# Patient Record
Sex: MALE | Race: WHITE | NOT HISPANIC OR LATINO | Employment: FULL TIME | URBAN - METROPOLITAN AREA
[De-identification: names, ages, dates, MRNs, and addresses within clinical notes are randomized per-mention and may not be internally consistent; named-entity substitution may affect disease eponyms.]

---

## 2018-11-27 ENCOUNTER — HOSPITAL ENCOUNTER (EMERGENCY)
Facility: CLINIC | Age: 43
Discharge: HOME | End: 2018-11-27
Attending: STUDENT IN AN ORGANIZED HEALTH CARE EDUCATION/TRAINING PROGRAM | Admitting: STUDENT IN AN ORGANIZED HEALTH CARE EDUCATION/TRAINING PROGRAM
Payer: COMMERCIAL

## 2018-11-27 VITALS
OXYGEN SATURATION: 100 % | DIASTOLIC BLOOD PRESSURE: 71 MMHG | WEIGHT: 216 LBS | SYSTOLIC BLOOD PRESSURE: 133 MMHG | TEMPERATURE: 98.8 F | HEART RATE: 82 BPM | RESPIRATION RATE: 16 BRPM

## 2018-11-27 DIAGNOSIS — R29.898 BILATERAL LEG WEAKNESS: ICD-10-CM

## 2018-11-27 LAB
ALBUMIN SERPL-MCNC: 3.9 G/DL (ref 3.4–5)
ALBUMIN UR-MCNC: NEGATIVE MG/DL
ALP SERPL-CCNC: 72 U/L (ref 40–150)
ALT SERPL W P-5'-P-CCNC: 60 U/L (ref 0–70)
ANION GAP SERPL CALCULATED.3IONS-SCNC: 7 MMOL/L (ref 3–14)
APPEARANCE UR: CLEAR
AST SERPL W P-5'-P-CCNC: 29 U/L (ref 0–45)
BASOPHILS # BLD AUTO: 0.1 10E9/L (ref 0–0.2)
BASOPHILS NFR BLD AUTO: 0.4 %
BILIRUB SERPL-MCNC: 0.5 MG/DL (ref 0.2–1.3)
BILIRUB UR QL STRIP: NEGATIVE
BUN SERPL-MCNC: 13 MG/DL (ref 7–30)
CALCIUM SERPL-MCNC: 8.4 MG/DL (ref 8.5–10.1)
CHLORIDE SERPL-SCNC: 107 MMOL/L (ref 94–109)
CK SERPL-CCNC: 223 U/L (ref 30–300)
CO2 SERPL-SCNC: 26 MMOL/L (ref 20–32)
COLOR UR AUTO: YELLOW
CREAT SERPL-MCNC: 0.83 MG/DL (ref 0.66–1.25)
DIFFERENTIAL METHOD BLD: ABNORMAL
EOSINOPHIL # BLD AUTO: 0.3 10E9/L (ref 0–0.7)
EOSINOPHIL NFR BLD AUTO: 2 %
ERYTHROCYTE [DISTWIDTH] IN BLOOD BY AUTOMATED COUNT: 13.1 % (ref 10–15)
GFR SERPL CREATININE-BSD FRML MDRD: >90 ML/MIN/1.7M2
GLUCOSE SERPL-MCNC: 109 MG/DL (ref 70–99)
GLUCOSE UR STRIP-MCNC: NEGATIVE MG/DL
HCT VFR BLD AUTO: 46.9 % (ref 40–53)
HGB BLD-MCNC: 15.6 G/DL (ref 13.3–17.7)
HGB UR QL STRIP: NEGATIVE
IMM GRANULOCYTES # BLD: 0.1 10E9/L (ref 0–0.4)
IMM GRANULOCYTES NFR BLD: 0.3 %
KETONES UR STRIP-MCNC: NEGATIVE MG/DL
LEUKOCYTE ESTERASE UR QL STRIP: NEGATIVE
LYMPHOCYTES # BLD AUTO: 2.4 10E9/L (ref 0.8–5.3)
LYMPHOCYTES NFR BLD AUTO: 15.5 %
MCH RBC QN AUTO: 28.8 PG (ref 26.5–33)
MCHC RBC AUTO-ENTMCNC: 33.3 G/DL (ref 31.5–36.5)
MCV RBC AUTO: 87 FL (ref 78–100)
MONOCYTES # BLD AUTO: 1.7 10E9/L (ref 0–1.3)
MONOCYTES NFR BLD AUTO: 11.1 %
MUCOUS THREADS #/AREA URNS LPF: PRESENT /LPF
NEUTROPHILS # BLD AUTO: 10.9 10E9/L (ref 1.6–8.3)
NEUTROPHILS NFR BLD AUTO: 70.7 %
NITRATE UR QL: NEGATIVE
NRBC # BLD AUTO: 0 10*3/UL
NRBC BLD AUTO-RTO: 0 /100
PH UR STRIP: 5 PH (ref 5–7)
PLATELET # BLD AUTO: 253 10E9/L (ref 150–450)
POTASSIUM SERPL-SCNC: 3.2 MMOL/L (ref 3.4–5.3)
PROT SERPL-MCNC: 7.5 G/DL (ref 6.8–8.8)
RBC # BLD AUTO: 5.41 10E12/L (ref 4.4–5.9)
RBC #/AREA URNS AUTO: <1 /HPF (ref 0–2)
SODIUM SERPL-SCNC: 140 MMOL/L (ref 133–144)
SOURCE: ABNORMAL
SP GR UR STRIP: 1.01 (ref 1–1.03)
UROBILINOGEN UR STRIP-MCNC: 0 MG/DL (ref 0–2)
WBC # BLD AUTO: 15.4 10E9/L (ref 4–11)
WBC #/AREA URNS AUTO: <1 /HPF (ref 0–5)

## 2018-11-27 PROCEDURE — 81001 URINALYSIS AUTO W/SCOPE: CPT | Performed by: STUDENT IN AN ORGANIZED HEALTH CARE EDUCATION/TRAINING PROGRAM

## 2018-11-27 PROCEDURE — 25000128 H RX IP 250 OP 636: Performed by: STUDENT IN AN ORGANIZED HEALTH CARE EDUCATION/TRAINING PROGRAM

## 2018-11-27 PROCEDURE — 99284 EMERGENCY DEPT VISIT MOD MDM: CPT | Mod: 25 | Performed by: STUDENT IN AN ORGANIZED HEALTH CARE EDUCATION/TRAINING PROGRAM

## 2018-11-27 PROCEDURE — 85025 COMPLETE CBC W/AUTO DIFF WBC: CPT | Performed by: STUDENT IN AN ORGANIZED HEALTH CARE EDUCATION/TRAINING PROGRAM

## 2018-11-27 PROCEDURE — 96361 HYDRATE IV INFUSION ADD-ON: CPT | Performed by: STUDENT IN AN ORGANIZED HEALTH CARE EDUCATION/TRAINING PROGRAM

## 2018-11-27 PROCEDURE — 82550 ASSAY OF CK (CPK): CPT | Performed by: STUDENT IN AN ORGANIZED HEALTH CARE EDUCATION/TRAINING PROGRAM

## 2018-11-27 PROCEDURE — 80053 COMPREHEN METABOLIC PANEL: CPT | Performed by: STUDENT IN AN ORGANIZED HEALTH CARE EDUCATION/TRAINING PROGRAM

## 2018-11-27 PROCEDURE — 25000132 ZZH RX MED GY IP 250 OP 250 PS 637: Performed by: STUDENT IN AN ORGANIZED HEALTH CARE EDUCATION/TRAINING PROGRAM

## 2018-11-27 PROCEDURE — 96360 HYDRATION IV INFUSION INIT: CPT | Performed by: STUDENT IN AN ORGANIZED HEALTH CARE EDUCATION/TRAINING PROGRAM

## 2018-11-27 PROCEDURE — 99284 EMERGENCY DEPT VISIT MOD MDM: CPT | Mod: Z6 | Performed by: STUDENT IN AN ORGANIZED HEALTH CARE EDUCATION/TRAINING PROGRAM

## 2018-11-27 RX ORDER — POTASSIUM CHLORIDE 1.5 G/1.58G
40 POWDER, FOR SOLUTION ORAL ONCE
Status: COMPLETED | OUTPATIENT
Start: 2018-11-27 | End: 2018-11-27

## 2018-11-27 RX ORDER — CETIRIZINE HYDROCHLORIDE 10 MG/1
10 TABLET ORAL DAILY
COMMUNITY
End: 2023-05-08

## 2018-11-27 RX ADMIN — SODIUM CHLORIDE, POTASSIUM CHLORIDE, SODIUM LACTATE AND CALCIUM CHLORIDE 1000 ML: 600; 310; 30; 20 INJECTION, SOLUTION INTRAVENOUS at 20:15

## 2018-11-27 RX ADMIN — POTASSIUM CHLORIDE 40 MEQ: 1.5 POWDER, FOR SOLUTION ORAL at 20:39

## 2018-11-27 RX ADMIN — SODIUM CHLORIDE, POTASSIUM CHLORIDE, SODIUM LACTATE AND CALCIUM CHLORIDE 1000 ML: 600; 310; 30; 20 INJECTION, SOLUTION INTRAVENOUS at 19:18

## 2018-11-27 NOTE — ED AVS SNAPSHOT
[facility] Emergency Department    [address] [address]    Phone:  [phone]    Fax:  [phone]                                       Dmitriy Saul   MRN: [id number]    Department:  [facility] Emergency Department   Date of Visit:  11/27/2018           Patient Information     Date Of Birth          1975        Your diagnoses for this visit were:     Bilateral leg weakness        You were seen by Yousuf Luis DO.      Follow-up Information     Follow up with [facility]. Schedule an appointment as soon as possible for a visit in 5 days.    Specialty:  Neurology    Why:  Followup for reevaluation if symptoms persist.    Contact information:    [address] [place] [id number] [phone]    Additional information:    The medical center is located at   [address] [place] [address] (between [facility] and   [facility] [address] [place] [address], four miles north   of [place]).        Go to [facility] Emergency Department.    Specialty:  EMERGENCY MEDICINE    Why:  Return if unsafe to ambulate, increasing weakness, symptoms progress or any other concerns.    Contact information:    [address] [place] [address] [phone]    Additional information:    The medical center is located at   [address] [place] [address] (between [facility] and   [facility]  in [place], [place] north   of [place]).      Discharge References/Attachments     WEAKNESS (UNCERTAIN CAUSE) (ENGLISH)    MYASTHENIA GRAVIS (ENGLISH)      24 Hour Appointment Hotline       To make an appointment at any [facility] [place], call [phone] ([phone]). If you don't have a family doctor or clinic, we will help you find one. [facility] are conveniently located to serve the needs of you and your family.             Review of your medicines      Our records show that you are taking the medicines listed below. If these are incorrect, please call your family doctor or clinic.         Dose / Directions Last dose taken    cetirizine 10 MG tablet   Commonly known as:  zyrTEC   Dose:  10 mg        Take 10 mg by mouth daily   Refills:  0                Procedures and tests performed during your visit     CBC with platelets, differential    CK total    Comprehensive metabolic panel    UA with Microscopic      Orders Needing Specimen Collection     None      Pending Results     No orders found from 11/25/2018 to 11/28/2018.            Pending Culture Results     No orders found from 11/25/2018 to 11/28/2018.            Pending Results Instructions     If you had any lab results that were not finalized at the time of your Discharge, you can call the ED Lab Result RN at [phone]. You will be contacted by this team for any positive Lab results or changes in treatment. The nurses are available 7 days a week from 10A to 6:30P.  You can leave a message 24 hours per day and they will return your call.        Test Results From Your Hospital Stay        11/27/2018  7:32 PM      Component Results     Component Value Ref Range & Units Status    WBC 15.4 (H) 4.0 - 11.0 10e9/L Final    RBC Count 5.41 4.4 - 5.9 10e12/L Final    Hemoglobin 15.6 13.3 - 17.7 g/dL Final    Hematocrit 46.9 40.0 - 53.0 % Final    MCV 87 78 - 100 fl Final    MCH 28.8 26.5 - 33.0 pg Final    MCHC 33.3 31.5 - 36.5 g/dL Final    RDW 13.1 10.0 - 15.0 % Final    Platelet Count 253 150 - 450 10e9/L Final    Diff Method Automated Method  Final    % Neutrophils 70.7 % Final    % Lymphocytes 15.5 % Final    % Monocytes 11.1 % Final    % Eosinophils 2.0 % Final    % Basophils 0.4 % Final    % Immature Granulocytes 0.3 % Final    Nucleated RBCs 0 0 /100 Final    Absolute Neutrophil 10.9 (H) 1.6 - 8.3 10e9/L Final    Absolute Lymphocytes 2.4 0.8 - 5.3 10e9/L Final    Absolute Monocytes 1.7 (H) 0.0 - 1.3 10e9/L Final    Absolute Eosinophils 0.3 0.0 - 0.7 10e9/L Final    Absolute Basophils 0.1 0.0 - 0.2 10e9/L Final    Abs Immature  Granulocytes 0.1 0 - 0.4 10e9/L Final    Absolute Nucleated RBC 0.0  Final         11/27/2018  7:48 PM      Component Results     Component Value Ref Range & Units Status    Sodium 140 133 - 144 mmol/L Final    Potassium 3.2 (L) 3.4 - 5.3 mmol/L Final    Chloride 107 94 - 109 mmol/L Final    Carbon Dioxide 26 20 - 32 mmol/L Final    Anion Gap 7 3 - 14 mmol/L Final    Glucose 109 (H) 70 - 99 mg/dL Final    Urea Nitrogen 13 7 - 30 mg/dL Final    Creatinine 0.83 0.66 - 1.25 mg/dL Final    GFR Estimate >90 >60 mL/min/1.7m2 Final    Non  GFR Calc    GFR Estimate If Black >90 >60 mL/min/1.7m2 Final    African American GFR Calc    Calcium 8.4 (L) 8.5 - 10.1 mg/dL Final    Bilirubin Total 0.5 0.2 - 1.3 mg/dL Final    Albumin 3.9 3.4 - 5.0 g/dL Final    Protein Total 7.5 6.8 - 8.8 g/dL Final    Alkaline Phosphatase 72 40 - 150 U/L Final    ALT 60 0 - 70 U/L Final    AST 29 0 - 45 U/L Final         11/27/2018  7:48 PM      Component Results     Component Value Ref Range & Units Status    CK Total 223 30 - 300 U/L Final         11/27/2018  8:12 PM      Component Results     Component Value Ref Range & Units Status    Color Urine Yellow  Final    Appearance Urine Clear  Final    Glucose Urine Negative NEG^Negative mg/dL Final    Bilirubin Urine Negative NEG^Negative Final    Ketones Urine Negative NEG^Negative mg/dL Final    Specific Gravity Urine 1.011 1.003 - 1.035 Final    Blood Urine Negative NEG^Negative Final    pH Urine 5.0 5.0 - 7.0 pH Final    Protein Albumin Urine Negative NEG^Negative mg/dL Final    Urobilinogen mg/dL 0.0 0.0 - 2.0 mg/dL Final    Nitrite Urine Negative NEG^Negative Final    Leukocyte Esterase Urine Negative NEG^Negative Final    Source Midstream Urine  Final    WBC Urine <1 0 - 5 /HPF Final    RBC Urine <1 0 - 2 /HPF Final    Mucous Urine Present (A) NEG^Negative /LPF Final                Thank you for choosing [facility]       Thank you for choosing [facility] for your care. Our goal is  "always to provide you with excellent care. Hearing back from our patients is one way we can continue to improve our services. Please take a few minutes to complete the written survey that you may receive in the mail after you visit with us. Thank you!        "[employer] Information     "[employer] lets you send messages to your doctor, view your test results, renew your prescriptions, schedule appointments and more. To sign up, go to [email or web address] . Click on \"Log in\" on the left side of the screen, which will take you to the Welcome page. Then click on \"Sign up Now\" on the right side of the page.     You will be asked to enter the access code listed below, as well as some personal information. Please follow the directions to create your username and password.     Your access code is: [email or web address]  Expires: 2019  8:50 PM     Your access code will  in 90 days. If you need help or a new code, please call your [facility] clinic or [phone].        Care EveryWhere ID     This is your Care EveryWhere ID. This could be used by other organizations to access your [facility] medical records  [id number]        Equal Access to Services     ALESSIA PEÑA : Kelli Nick, waevaristo gill, qaranjan do, galina sandoval. So [facility] [phone].    ATENCIÓN: Si habla español, tiene a ledbetter disposición servicios gratuitos de asistencia lingüística. Teresa al [phone].    We comply with applicable federal civil rights laws and [place] laws. We do not discriminate on the basis of race, color, national origin, age, disability, sex, sexual orientation, or gender identity.            After Visit Summary       This is your record. Keep this with you and show to your community pharmacist(s) and doctor(s) at your next visit.                  "

## 2018-11-27 NOTE — ED AVS SNAPSHOT
[facility] Emergency Department    [address] [address]    Phone:  [phone]    Fax:  [phone]                                       Dmitriy Saul   MRN: [id number]    Department:  [facility] Emergency Department   Date of Visit:  11/27/2018           After Visit Summary Signature Page     I have received my discharge instructions, and my questions have been answered. I have discussed any challenges I see with this plan with the nurse or doctor.    ..........................................................................................................................................  Patient/Patient Representative Signature      ..........................................................................................................................................  Patient Representative Print Name and Relationship to Patient    ..................................................               ................................................  Date                                   Time    ..........................................................................................................................................  Reviewed by Signature/Title    ...................................................              ..............................................  Date                                               Time          22EPIC Rev 08/18

## 2018-11-28 NOTE — ED NOTES
Progressive leg weakness today, rode stationary bike for a couple hours this morning, muscle tightness in quadriceps that has increased, CMS intact to LE, unable to stand on own, needed assist of 3 for transfer

## 2018-11-28 NOTE — ED PROVIDER NOTES
History     Chief Complaint   Patient presents with     Extremity Weakness     legs after workout     HPI  Dmitriy Saul is a 43 year old male with no significant past medical history presents for evaluation of bilateral thigh tightness and leg weakness after extensive exercise earlier in the day.  Patient explains that he paddles on an indoor bicycle 1-2 times weekly but typically in the evenings.  Today he began his workout routine around 8:45 AM prior to going to work, he wrote on the bike for nearly 2 hours which is a bit longer than typical but was without significant pain or weakness afterwards.  Throughout the day he has grown gradually more weak and thighs gradually becoming more tight but denies any significant pain or discomfort.  His legs have grown so weak that they have simply given out on multiple occasions causing him to sit backwards on his legs.  His significant other is a physician and has noted that the patient is unable to ambulate without assistance secondary to his lower extremity weakness.  Wife is specifically concerned about the possibility of rhabdomyolysis.  Patient denies fever, chills, chest pain, shortness of breath, extremity edema, sensory deficits, distal extremity or foot weakness.    Problem List:    There are no active problems to display for this patient.       Past Medical History:    History reviewed. No pertinent past medical history.    Past Surgical History:    History reviewed. No pertinent surgical history.    Family History:    No family history on file.    Social History:  Marital Status:   [2]  Social History   Substance Use Topics     Smoking status: Never Smoker     Smokeless tobacco: Never Used     Alcohol use Yes      Comment: occ        Medications:      cetirizine (ZYRTEC) 10 MG tablet         Review of Systems  Constitutional:  Negative for fever or recent illness.  Cardiovascular:  Negative for chest pain.  Respiratory:  Negative for cough or  shortness of breath.  Gastrointestinal:  Negative for abdominal pain, nausea, vomiting, or diarrhea.  Genitourinary:  Negative for dysuria or oliguria.  Musculoskeletal: Positive for mild achy bilateral thigh tightness.  Negative for back pain.  Neurological: Positive for bilateral proximal leg weakness.  Negative for sensory deficit or foot weakness.    All others reviewed and are negative.      Physical Exam   BP: 148/82  Pulse: 82  Temp: 98.8  F (37.1  C)  Resp: 18  Weight: 98 kg (216 lb)  SpO2: 99 %      Physical Exam  Constitutional:  Well developed, well nourished.  Appears nontoxic and in no acute distress.    HENT:  Normocephalic and atraumatic.  Symmetric in appearance.  Eyes:  Conjunctivae are normal.  Neck:  Neck supple.  Cardiovascular:  No cyanosis.  RRR.  No audible murmurs noted.  No lower extremity edema or asymmetry.   Respiratory:  Effort normal without sign of respiratory distress.  CTAB without diminished regions.    Gastrointestinal:  Soft nondistended abdomen.  Nontender and without guarding.  No rigidity or rebound tenderness.  Negative Aragon's sign.  Negative McBurney's point.   Genitourinary:  Noncontributory.   Musculoskeletal: 4/5 strength of bilateral hip flexors.  5/5 strength of bilateral knee extensors, dorsi and plantar flexion.  Sensation of medial leg, dorsum of foot, and planter aspect of foot are intact bilaterally and without evidence of saddle anesthesia.  2/4 patellar reflexes bilaterally.    Neurological:  Patient is alert.  Skin:  Skin is warm and dry.  Psychiatric:  Normal mood and affect.      ED Course     ED Course     Procedures               Critical Care time:  none               Results for orders placed or performed during the hospital encounter of 11/27/18 (from the past 24 hour(s))   CBC with platelets, differential   Result Value Ref Range    WBC 15.4 (H) 4.0 - 11.0 10e9/L    RBC Count 5.41 4.4 - 5.9 10e12/L    Hemoglobin 15.6 13.3 - 17.7 g/dL    Hematocrit 46.9  40.0 - 53.0 %    MCV 87 78 - 100 fl    MCH 28.8 26.5 - 33.0 pg    MCHC 33.3 31.5 - 36.5 g/dL    RDW 13.1 10.0 - 15.0 %    Platelet Count 253 150 - 450 10e9/L    Diff Method Automated Method     % Neutrophils 70.7 %    % Lymphocytes 15.5 %    % Monocytes 11.1 %    % Eosinophils 2.0 %    % Basophils 0.4 %    % Immature Granulocytes 0.3 %    Nucleated RBCs 0 0 /100    Absolute Neutrophil 10.9 (H) 1.6 - 8.3 10e9/L    Absolute Lymphocytes 2.4 0.8 - 5.3 10e9/L    Absolute Monocytes 1.7 (H) 0.0 - 1.3 10e9/L    Absolute Eosinophils 0.3 0.0 - 0.7 10e9/L    Absolute Basophils 0.1 0.0 - 0.2 10e9/L    Abs Immature Granulocytes 0.1 0 - 0.4 10e9/L    Absolute Nucleated RBC 0.0    Comprehensive metabolic panel   Result Value Ref Range    Sodium 140 133 - 144 mmol/L    Potassium 3.2 (L) 3.4 - 5.3 mmol/L    Chloride 107 94 - 109 mmol/L    Carbon Dioxide 26 20 - 32 mmol/L    Anion Gap 7 3 - 14 mmol/L    Glucose 109 (H) 70 - 99 mg/dL    Urea Nitrogen 13 7 - 30 mg/dL    Creatinine 0.83 0.66 - 1.25 mg/dL    GFR Estimate >90 >60 mL/min/1.7m2    GFR Estimate If Black >90 >60 mL/min/1.7m2    Calcium 8.4 (L) 8.5 - 10.1 mg/dL    Bilirubin Total 0.5 0.2 - 1.3 mg/dL    Albumin 3.9 3.4 - 5.0 g/dL    Protein Total 7.5 6.8 - 8.8 g/dL    Alkaline Phosphatase 72 40 - 150 U/L    ALT 60 0 - 70 U/L    AST 29 0 - 45 U/L   CK total   Result Value Ref Range    CK Total 223 30 - 300 U/L   UA with Microscopic   Result Value Ref Range    Color Urine Yellow     Appearance Urine Clear     Glucose Urine Negative NEG^Negative mg/dL    Bilirubin Urine Negative NEG^Negative    Ketones Urine Negative NEG^Negative mg/dL    Specific Gravity Urine 1.011 1.003 - 1.035    Blood Urine Negative NEG^Negative    pH Urine 5.0 5.0 - 7.0 pH    Protein Albumin Urine Negative NEG^Negative mg/dL    Urobilinogen mg/dL 0.0 0.0 - 2.0 mg/dL    Nitrite Urine Negative NEG^Negative    Leukocyte Esterase Urine Negative NEG^Negative    Source Midstream Urine     WBC Urine <1 0 - 5 /HPF     RBC Urine <1 0 - 2 /HPF    Mucous Urine Present (A) NEG^Negative /LPF       Medications   lactated ringers BOLUS 1,000 mL (0 mLs Intravenous Stopped 11/27/18 2051)   lactated ringers BOLUS 1,000 mL (0 mLs Intravenous Stopped 11/27/18 2015)   potassium chloride (KLOR-CON) Packet 40 mEq (40 mEq Oral Given 11/27/18 2039)       Assessments & Plan (with Medical Decision Making)   Dmitriy Saul is a 43 year old male who presents to the department for evaluation of increasing bilateral leg weakness over the past 12 hours since participating in a strenuous lower extremity workout this morning.  Patient admits to using his bicycle for working out 1-2 times weekly and today's workup was a little more intense and different muscle group but has never had weakness or falls in the past after similar workout routine.  He has no significant pain or tenderness of his legs on examination, denies back pain or recent traumatic injury, no fever or infectious symptoms.  Low suspicion for atraumatic fracture, cauda equina syndrome, spinal cord compression, metastatic mass, epidural hematoma, transverse myelitis, acute flaccid myelitis, Guillain Ney syndrome, multiple sclerosis or amyotrophic lateral sclerosis.  Mild elevation of WBC but nonspecific finding.  CK and creatinine within reference range.  Patient has no appreciable sensory deficits on examination and his hip flexors grew in strength during stay in the department after resting on the gurney.  [place] on-call neurologist Dr. Frederick was consulted, suggests that atypical myasthenia gravis or AV fistula could cause symptoms but rare and likely requiring outpatient imaging and workup.  She recommends discharge with close monitoring and patient follow-up with neurology clinic within 1 week's time if symptoms do not completely resolved.  Patient was eventually able to ambulate in the department with minimal assistance and feels safe returning home at this time.  I spent a  significant amount of time discussing my concerns after his initial workup and discharge plan including strict return instructions for developing infectious symptoms, sensory deficits, return of weakness, pain or other concerns.  Both patient and accompanying significant other are in agreement with discharge plan including neurology follow-up        Disclaimer:  This note consists of symbols derived from keyboarding, dictation, and/or voice recognition software.  As a result, there may be errors in the script that have gone undetected.  Please consider this when interpreting information found in the chart.        I have reviewed the nursing notes.    I have reviewed the findings, diagnosis, plan and need for follow up with the patient.       New Prescriptions    No medications on file       Final diagnoses:   Bilateral leg weakness       11/27/2018   [facility] EMERGENCY DEPARTMENT     Yousuf Luis DO  11/27/18 3472

## 2019-10-21 ENCOUNTER — IMMUNIZATION (OUTPATIENT)
Dept: FAMILY MEDICINE | Facility: CLINIC | Age: 44
End: 2019-10-21
Payer: COMMERCIAL

## 2019-10-21 PROCEDURE — 90471 IMMUNIZATION ADMIN: CPT

## 2019-10-21 PROCEDURE — 90686 IIV4 VACC NO PRSV 0.5 ML IM: CPT

## 2020-09-14 ENCOUNTER — OFFICE VISIT (OUTPATIENT)
Dept: FAMILY MEDICINE | Facility: CLINIC | Age: 45
End: 2020-09-14
Payer: COMMERCIAL

## 2020-09-14 DIAGNOSIS — Z23 NEED FOR PROPHYLACTIC VACCINATION AND INOCULATION AGAINST INFLUENZA: Primary | ICD-10-CM

## 2020-09-14 PROCEDURE — 90471 IMMUNIZATION ADMIN: CPT

## 2020-09-14 PROCEDURE — 99207 ZZC NO CHARGE NURSE ONLY: CPT

## 2020-09-14 PROCEDURE — 90686 IIV4 VACC NO PRSV 0.5 ML IM: CPT

## 2021-02-15 ENCOUNTER — IMMUNIZATION (OUTPATIENT)
Dept: PEDIATRICS | Facility: CLINIC | Age: 46
End: 2021-02-15
Payer: COMMERCIAL

## 2021-02-15 PROCEDURE — 0001A PR COVID VAC PFIZER DIL RECON 30 MCG/0.3 ML IM: CPT

## 2021-02-15 PROCEDURE — 91300 PR COVID VAC PFIZER DIL RECON 30 MCG/0.3 ML IM: CPT

## 2021-03-08 ENCOUNTER — IMMUNIZATION (OUTPATIENT)
Dept: PEDIATRICS | Facility: CLINIC | Age: 46
End: 2021-03-08
Attending: INTERNAL MEDICINE
Payer: COMMERCIAL

## 2021-03-08 PROCEDURE — 91300 PR COVID VAC PFIZER DIL RECON 30 MCG/0.3 ML IM: CPT

## 2021-03-08 PROCEDURE — 0002A PR COVID VAC PFIZER DIL RECON 30 MCG/0.3 ML IM: CPT

## 2021-10-08 ENCOUNTER — OFFICE VISIT (OUTPATIENT)
Dept: FAMILY MEDICINE | Facility: CLINIC | Age: 46
End: 2021-10-08
Payer: COMMERCIAL

## 2021-10-08 ENCOUNTER — ANCILLARY PROCEDURE (OUTPATIENT)
Dept: GENERAL RADIOLOGY | Facility: CLINIC | Age: 46
End: 2021-10-08
Attending: NURSE PRACTITIONER
Payer: COMMERCIAL

## 2021-10-08 VITALS
RESPIRATION RATE: 16 BRPM | WEIGHT: 241 LBS | BODY MASS INDEX: 28.46 KG/M2 | HEIGHT: 77 IN | DIASTOLIC BLOOD PRESSURE: 72 MMHG | SYSTOLIC BLOOD PRESSURE: 128 MMHG | HEART RATE: 64 BPM | OXYGEN SATURATION: 96 % | TEMPERATURE: 98.6 F

## 2021-10-08 DIAGNOSIS — S22.32XA CLOSED FRACTURE OF ONE RIB OF LEFT SIDE, INITIAL ENCOUNTER: ICD-10-CM

## 2021-10-08 PROCEDURE — 71101 X-RAY EXAM UNILAT RIBS/CHEST: CPT | Mod: LT | Performed by: RADIOLOGY

## 2021-10-08 PROCEDURE — 99203 OFFICE O/P NEW LOW 30 MIN: CPT | Performed by: NURSE PRACTITIONER

## 2021-10-08 RX ORDER — OXYCODONE HYDROCHLORIDE 5 MG/1
5 TABLET ORAL EVERY 6 HOURS PRN
Qty: 20 TABLET | Refills: 0 | Status: SHIPPED | OUTPATIENT
Start: 2021-10-08 | End: 2021-10-11

## 2021-10-08 ASSESSMENT — MIFFLIN-ST. JEOR: SCORE: 2095.55

## 2021-10-08 NOTE — PATIENT INSTRUCTIONS
Can try lidocaine patches over the tender area. Can be purchased over the counter.  I placed an order for pain management for consideration of rib block.  Continue tylenol, ibuprofen as needed.  Can try oxycodone.    Narcotics Discharge Instructions: (Roxicodone, Dilaudid, Ultram)  You have been prescribed a narcotic pain medication that has risk for addiction with prolonged use, so please use sparingly.  Additionally, narcotics are medications that are sedating (will make you sleepy), so do not drive or operate machinery while taking this medication.  Avoid alcohol or other sedating medicines such as benzodiazepines while taking narcotics due to risk for increased sedation and difficulty breathing.      Narcotics will cause constipation.  If you need to take this medication please consider taking an over-the-counter stool softener and laxative, such as Senna Plus, to prevent constipation from developing.  Nausea is a side effect of narcotic use.  Possible additional side effects include vomiting, itching, and dizziness/lightheadedness.
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
## 2021-11-03 ENCOUNTER — IMMUNIZATION (OUTPATIENT)
Dept: FAMILY MEDICINE | Facility: CLINIC | Age: 46
End: 2021-11-03
Payer: COMMERCIAL

## 2021-11-03 PROCEDURE — 90471 IMMUNIZATION ADMIN: CPT

## 2021-11-03 PROCEDURE — 90686 IIV4 VACC NO PRSV 0.5 ML IM: CPT

## 2021-11-07 ENCOUNTER — HEALTH MAINTENANCE LETTER (OUTPATIENT)
Age: 46
End: 2021-11-07

## 2021-11-27 ASSESSMENT — ENCOUNTER SYMPTOMS
CONSTIPATION: 0
SORE THROAT: 0
JOINT SWELLING: 0
HEARTBURN: 0
DIARRHEA: 0
NERVOUS/ANXIOUS: 0
WEAKNESS: 0
PARESTHESIAS: 0
COUGH: 0
HEMATURIA: 0
ABDOMINAL PAIN: 0
CHILLS: 0
PALPITATIONS: 0
MYALGIAS: 0
NAUSEA: 0
EYE PAIN: 0
HEMATOCHEZIA: 0
DIZZINESS: 0
HEADACHES: 0
ARTHRALGIAS: 0
FEVER: 0
SHORTNESS OF BREATH: 0
DYSURIA: 0
FREQUENCY: 0

## 2021-11-29 ENCOUNTER — OFFICE VISIT (OUTPATIENT)
Dept: FAMILY MEDICINE | Facility: CLINIC | Age: 46
End: 2021-11-29
Payer: COMMERCIAL

## 2021-11-29 VITALS
HEIGHT: 77 IN | RESPIRATION RATE: 16 BRPM | SYSTOLIC BLOOD PRESSURE: 128 MMHG | TEMPERATURE: 98.3 F | BODY MASS INDEX: 27.63 KG/M2 | DIASTOLIC BLOOD PRESSURE: 62 MMHG | WEIGHT: 234 LBS

## 2021-11-29 DIAGNOSIS — Z13.220 SCREENING CHOLESTEROL LEVEL: ICD-10-CM

## 2021-11-29 DIAGNOSIS — Z00.00 ROUTINE GENERAL MEDICAL EXAMINATION AT A HEALTH CARE FACILITY: Primary | ICD-10-CM

## 2021-11-29 DIAGNOSIS — Z11.59 ENCOUNTER FOR HEPATITIS C SCREENING TEST FOR LOW RISK PATIENT: ICD-10-CM

## 2021-11-29 DIAGNOSIS — Z11.4 ENCOUNTER FOR SCREENING FOR HIV: ICD-10-CM

## 2021-11-29 DIAGNOSIS — Z12.11 COLON CANCER SCREENING: ICD-10-CM

## 2021-11-29 DIAGNOSIS — Z11.59 NEED FOR HEPATITIS C SCREENING TEST: ICD-10-CM

## 2021-11-29 DIAGNOSIS — Z23 NEED FOR VACCINATION: ICD-10-CM

## 2021-11-29 LAB
ANION GAP SERPL CALCULATED.3IONS-SCNC: 5 MMOL/L (ref 3–14)
BUN SERPL-MCNC: 14 MG/DL (ref 7–30)
CALCIUM SERPL-MCNC: 9.2 MG/DL (ref 8.5–10.1)
CHLORIDE BLD-SCNC: 107 MMOL/L (ref 94–109)
CHOLEST SERPL-MCNC: 238 MG/DL
CO2 SERPL-SCNC: 28 MMOL/L (ref 20–32)
CREAT SERPL-MCNC: 0.84 MG/DL (ref 0.66–1.25)
FASTING STATUS PATIENT QL REPORTED: YES
GFR SERPL CREATININE-BSD FRML MDRD: >90 ML/MIN/1.73M2
GLUCOSE BLD-MCNC: 98 MG/DL (ref 70–99)
HCV AB SERPL QL IA: NONREACTIVE
HDLC SERPL-MCNC: 42 MG/DL
HIV 1+2 AB+HIV1 P24 AG SERPL QL IA: NONREACTIVE
LDLC SERPL CALC-MCNC: 145 MG/DL
NONHDLC SERPL-MCNC: 196 MG/DL
POTASSIUM BLD-SCNC: 4 MMOL/L (ref 3.4–5.3)
SODIUM SERPL-SCNC: 140 MMOL/L (ref 133–144)
TRIGL SERPL-MCNC: 256 MG/DL

## 2021-11-29 PROCEDURE — 80061 LIPID PANEL: CPT | Performed by: FAMILY MEDICINE

## 2021-11-29 PROCEDURE — 99396 PREV VISIT EST AGE 40-64: CPT | Mod: 25 | Performed by: FAMILY MEDICINE

## 2021-11-29 PROCEDURE — 80048 BASIC METABOLIC PNL TOTAL CA: CPT | Performed by: FAMILY MEDICINE

## 2021-11-29 PROCEDURE — 90715 TDAP VACCINE 7 YRS/> IM: CPT | Performed by: FAMILY MEDICINE

## 2021-11-29 PROCEDURE — 90471 IMMUNIZATION ADMIN: CPT | Performed by: FAMILY MEDICINE

## 2021-11-29 PROCEDURE — 86803 HEPATITIS C AB TEST: CPT | Performed by: FAMILY MEDICINE

## 2021-11-29 PROCEDURE — 36415 COLL VENOUS BLD VENIPUNCTURE: CPT | Performed by: FAMILY MEDICINE

## 2021-11-29 PROCEDURE — 87389 HIV-1 AG W/HIV-1&-2 AB AG IA: CPT | Performed by: FAMILY MEDICINE

## 2021-11-29 ASSESSMENT — ENCOUNTER SYMPTOMS
CONSTIPATION: 0
NAUSEA: 0
SHORTNESS OF BREATH: 0
ABDOMINAL PAIN: 0
DYSURIA: 0
WEAKNESS: 0
HEARTBURN: 0
DIZZINESS: 0
FREQUENCY: 0
ARTHRALGIAS: 0
EYE PAIN: 0
MYALGIAS: 0
NERVOUS/ANXIOUS: 0
CHILLS: 0
FEVER: 0
DIARRHEA: 0
HEMATURIA: 0
JOINT SWELLING: 0
SORE THROAT: 0
HEMATOCHEZIA: 0
COUGH: 0
PARESTHESIAS: 0
HEADACHES: 0
PALPITATIONS: 0

## 2021-11-29 ASSESSMENT — MIFFLIN-ST. JEOR: SCORE: 2056.41

## 2021-11-29 NOTE — PATIENT INSTRUCTIONS
Lab work today.       Preventive Health Recommendations  Male Ages 40 to 49    Yearly exam:             See your health care provider every year in order to  o   Review health changes.   o   Discuss preventive care.    o   Review your medicines if your doctor has prescribed any.    You should be tested each year for STDs (sexually transmitted diseases) if you re at risk.     Have a cholesterol test every 5 years.     Have a colonoscopy (test for colon cancer) if someone in your family has had colon cancer or polyps before age 50.     After age 45, have a diabetes test (fasting glucose). If you are at risk for diabetes, you should have this test every 3 years.      Talk with your health care provider about whether or not a prostate cancer screening test (PSA) is right for you.    Shots: Get a flu shot each year. Get a tetanus shot every 10 years.     Nutrition:    Eat at least 5 servings of fruits and vegetables daily.     Eat whole-grain bread, whole-wheat pasta and brown rice instead of white grains and rice.     Get adequate Calcium and Vitamin D.     Lifestyle    Exercise for at least 150 minutes a week (30 minutes a day, 5 days a week). This will help you control your weight and prevent disease.     Limit alcohol to one drink per day.     No smoking.     Wear sunscreen to prevent skin cancer.     See your dentist every six months for an exam and cleaning.

## 2021-11-29 NOTE — NURSING NOTE
Prior to immunization administration, verified patients identity using patient s name and date of birth. Please see Immunization Activity for additional information.     Screening Questionnaire for Adult Immunization    Are you sick today?   No   Do you have allergies to medications, food, a vaccine component or latex?   No   Have you ever had a serious reaction after receiving a vaccination?   No   Do you have a long-term health problem with heart, lung, kidney, or metabolic disease (e.g., diabetes), asthma, a blood disorder, no spleen, complement component deficiency, a cochlear implant, or a spinal fluid leak?  Are you on long-term aspirin therapy?   No   Do you have cancer, leukemia, HIV/AIDS, or any other immune system problem?   No   Do you have a parent, brother, or sister with an immune system problem?   No   In the past 3 months, have you taken medications that affect  your immune system, such as prednisone, other steroids, or anticancer drugs; drugs for the treatment of rheumatoid arthritis, Crohn s disease, or psoriasis; or have you had radiation treatments?   No   Have you had a seizure, or a brain or other nervous system problem?   No   During the past year, have you received a transfusion of blood or blood    products, or been given immune (gamma) globulin or antiviral drug?   No   For women: Are you pregnant or is there a chance you could become       pregnant during the next month?   No   Have you received any vaccinations in the past 4 weeks?   No     Immunization questionnaire answers were all negative.        Per orders of Dr. Suárez, injection of TDAP given by Chan Cm. Patient instructed to remain in clinic for 15 minutes afterwards, and to report any adverse reaction to me immediately.       Screening performed by Chan Cm on 11/29/2021 at 10:52 AM.
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
## 2022-10-11 ENCOUNTER — IMMUNIZATION (OUTPATIENT)
Dept: FAMILY MEDICINE | Facility: CLINIC | Age: 47
End: 2022-10-11
Payer: COMMERCIAL

## 2022-10-11 PROCEDURE — 90471 IMMUNIZATION ADMIN: CPT

## 2022-10-11 PROCEDURE — 90686 IIV4 VACC NO PRSV 0.5 ML IM: CPT

## 2022-10-17 ENCOUNTER — IMMUNIZATION (OUTPATIENT)
Dept: FAMILY MEDICINE | Facility: CLINIC | Age: 47
End: 2022-10-17
Payer: COMMERCIAL

## 2022-10-17 PROCEDURE — 91312 COVID-19,PF,PFIZER BOOSTER BIVALENT: CPT

## 2022-10-17 PROCEDURE — 0124A COVID-19,PF,PFIZER BOOSTER BIVALENT: CPT

## 2023-04-09 ENCOUNTER — HEALTH MAINTENANCE LETTER (OUTPATIENT)
Age: 48
End: 2023-04-09

## 2023-05-08 ENCOUNTER — OFFICE VISIT (OUTPATIENT)
Dept: FAMILY MEDICINE | Facility: CLINIC | Age: 48
End: 2023-05-08
Payer: COMMERCIAL

## 2023-05-08 VITALS
TEMPERATURE: 97.2 F | BODY MASS INDEX: 27.87 KG/M2 | OXYGEN SATURATION: 98 % | SYSTOLIC BLOOD PRESSURE: 110 MMHG | HEART RATE: 56 BPM | WEIGHT: 236 LBS | RESPIRATION RATE: 20 BRPM | HEIGHT: 77 IN | DIASTOLIC BLOOD PRESSURE: 70 MMHG

## 2023-05-08 DIAGNOSIS — Z13.1 SCREENING FOR DIABETES MELLITUS: ICD-10-CM

## 2023-05-08 DIAGNOSIS — Z12.11 SCREEN FOR COLON CANCER: ICD-10-CM

## 2023-05-08 DIAGNOSIS — Z00.00 ROUTINE GENERAL MEDICAL EXAMINATION AT A HEALTH CARE FACILITY: Primary | ICD-10-CM

## 2023-05-08 DIAGNOSIS — Z13.220 SCREENING CHOLESTEROL LEVEL: ICD-10-CM

## 2023-05-08 PROBLEM — Z86.018 HISTORY OF DYSPLASTIC NEVUS: Status: ACTIVE | Noted: 2022-04-04

## 2023-05-08 LAB
ANION GAP SERPL CALCULATED.3IONS-SCNC: 7 MMOL/L (ref 7–15)
BASOPHILS # BLD AUTO: 0 10E3/UL (ref 0–0.2)
BASOPHILS NFR BLD AUTO: 0 %
BUN SERPL-MCNC: 13 MG/DL (ref 6–20)
CALCIUM SERPL-MCNC: 9.3 MG/DL (ref 8.6–10)
CHLORIDE SERPL-SCNC: 103 MMOL/L (ref 98–107)
CHOLEST SERPL-MCNC: 217 MG/DL
CREAT SERPL-MCNC: 0.99 MG/DL (ref 0.67–1.17)
DEPRECATED HCO3 PLAS-SCNC: 29 MMOL/L (ref 22–29)
EOSINOPHIL # BLD AUTO: 0.2 10E3/UL (ref 0–0.7)
EOSINOPHIL NFR BLD AUTO: 3 %
ERYTHROCYTE [DISTWIDTH] IN BLOOD BY AUTOMATED COUNT: 12.8 % (ref 10–15)
GFR SERPL CREATININE-BSD FRML MDRD: >90 ML/MIN/1.73M2
GLUCOSE SERPL-MCNC: 99 MG/DL (ref 70–99)
HCT VFR BLD AUTO: 48.4 % (ref 40–53)
HDLC SERPL-MCNC: 32 MG/DL
HGB BLD-MCNC: 16 G/DL (ref 13.3–17.7)
IMM GRANULOCYTES # BLD: 0 10E3/UL
IMM GRANULOCYTES NFR BLD: 0 %
LDLC SERPL CALC-MCNC: 141 MG/DL
LYMPHOCYTES # BLD AUTO: 2.9 10E3/UL (ref 0.8–5.3)
LYMPHOCYTES NFR BLD AUTO: 32 %
MCH RBC QN AUTO: 28.2 PG (ref 26.5–33)
MCHC RBC AUTO-ENTMCNC: 33.1 G/DL (ref 31.5–36.5)
MCV RBC AUTO: 85 FL (ref 78–100)
MONOCYTES # BLD AUTO: 0.8 10E3/UL (ref 0–1.3)
MONOCYTES NFR BLD AUTO: 8 %
NEUTROPHILS # BLD AUTO: 5 10E3/UL (ref 1.6–8.3)
NEUTROPHILS NFR BLD AUTO: 56 %
NONHDLC SERPL-MCNC: 185 MG/DL
PLATELET # BLD AUTO: 268 10E3/UL (ref 150–450)
POTASSIUM SERPL-SCNC: 4.4 MMOL/L (ref 3.4–5.3)
RBC # BLD AUTO: 5.68 10E6/UL (ref 4.4–5.9)
SODIUM SERPL-SCNC: 139 MMOL/L (ref 136–145)
TRIGL SERPL-MCNC: 221 MG/DL
TSH SERPL DL<=0.005 MIU/L-ACNC: 0.55 UIU/ML (ref 0.3–4.2)
WBC # BLD AUTO: 8.9 10E3/UL (ref 4–11)

## 2023-05-08 PROCEDURE — 80061 LIPID PANEL: CPT | Performed by: FAMILY MEDICINE

## 2023-05-08 PROCEDURE — 80048 BASIC METABOLIC PNL TOTAL CA: CPT | Performed by: FAMILY MEDICINE

## 2023-05-08 PROCEDURE — 84443 ASSAY THYROID STIM HORMONE: CPT | Performed by: FAMILY MEDICINE

## 2023-05-08 PROCEDURE — 99396 PREV VISIT EST AGE 40-64: CPT | Performed by: FAMILY MEDICINE

## 2023-05-08 PROCEDURE — 85025 COMPLETE CBC W/AUTO DIFF WBC: CPT | Performed by: FAMILY MEDICINE

## 2023-05-08 PROCEDURE — 36415 COLL VENOUS BLD VENIPUNCTURE: CPT | Performed by: FAMILY MEDICINE

## 2023-05-08 RX ORDER — OLOPATADINE HYDROCHLORIDE 1 MG/ML
1 SOLUTION/ DROPS OPHTHALMIC 2 TIMES DAILY
COMMUNITY

## 2023-05-08 ASSESSMENT — ENCOUNTER SYMPTOMS
PARESTHESIAS: 0
ARTHRALGIAS: 0
NERVOUS/ANXIOUS: 0
HEADACHES: 0
FREQUENCY: 0
DYSURIA: 0
PALPITATIONS: 0
CONSTIPATION: 0
DIARRHEA: 0
NAUSEA: 0
ABDOMINAL PAIN: 0
CHILLS: 0
EYE PAIN: 0
HEARTBURN: 0
COUGH: 0
HEMATURIA: 0
WEAKNESS: 0
DIZZINESS: 0
SHORTNESS OF BREATH: 0
SORE THROAT: 0
FEVER: 0
MYALGIAS: 0
JOINT SWELLING: 0
HEMATOCHEZIA: 0

## 2023-05-08 ASSESSMENT — PAIN SCALES - GENERAL: PAINLEVEL: NO PAIN (0)

## 2023-05-08 NOTE — PATIENT INSTRUCTIONS
Lab work today.     Schedule Colonoscopy.       Preventive Health Recommendations  Male Ages 40 to 49    Yearly exam:             See your health care provider every year in order to  o   Review health changes.   o   Discuss preventive care.    o   Review your medicines if your doctor has prescribed any.  You should be tested each year for STDs (sexually transmitted diseases) if you re at risk.   Have a cholesterol test every 5 years.   Have a colonoscopy (test for colon cancer) if someone in your family has had colon cancer or polyps before age 50.   After age 45, have a diabetes test (fasting glucose). If you are at risk for diabetes, you should have this test every 3 years.    Talk with your health care provider about whether or not a prostate cancer screening test (PSA) is right for you.    Shots: Get a flu shot each year. Get a tetanus shot every 10 years.     Nutrition:  Eat at least 5 servings of fruits and vegetables daily.   Eat whole-grain bread, whole-wheat pasta and brown rice instead of white grains and rice.   Get adequate Calcium and Vitamin D.     Lifestyle  Exercise for at least 150 minutes a week (30 minutes a day, 5 days a week). This will help you control your weight and prevent disease.   Limit alcohol to one drink per day.   No smoking.   Wear sunscreen to prevent skin cancer.   See your dentist every six months for an exam and cleaning.

## 2023-05-08 NOTE — PROGRESS NOTES
SUBJECTIVE:   CC: Dmitriy is an 47 year old who presents for preventative health visit.                  Patient has been advised of split billing requirements and indicates understanding: Yes  Healthy Habits:     Getting at least 3 servings of Calcium per day:  Yes    Bi-annual eye exam:  Yes    Dental care twice a year:  Yes    Sleep apnea or symptoms of sleep apnea:  None    Diet:  Regular (no restrictions)    Frequency of exercise:  4-5 days/week    Duration of exercise:  Greater than 60 minutes    Taking medications regularly:  Yes    Medication side effects:  None    PHQ-2 Total Score: 0    Additional concerns today:  No       Cholesterol: screen today fasting  Diabetes: screen today fasting  Colon Cancer: never screened, order placed.   HIV screen: negative in past   Hepatitis C screen: negative   Diet/Exercise: very active with biking.   Tobacco: non-user.       The 10-year ASCVD risk score (Jaden GARCIA, et al., 2019) is: 2.9%    Values used to calculate the score:      Age: 47 years      Sex: Male      Is Non- : No      Diabetic: No      Tobacco smoker: No      Systolic Blood Pressure: 110 mmHg      Is BP treated: No      HDL Cholesterol: 42 mg/dL      Total Cholesterol: 238 mg/dL        Today's PHQ-2 Score:       5/8/2023     9:02 AM   PHQ-2 ( 1999 Pfizer)   Q1: Little interest or pleasure in doing things 0   Q2: Feeling down, depressed or hopeless 0   PHQ-2 Score 0   Q1: Little interest or pleasure in doing things Not at all    Not at all   Q2: Feeling down, depressed or hopeless Not at all    Not at all   PHQ-2 Score 0    0           Social History     Tobacco Use     Smoking status: Never     Smokeless tobacco: Never   Vaping Use     Vaping status: Never Used   Substance Use Topics     Alcohol use: Yes     Comment: occ             5/8/2023     9:02 AM   Alcohol Use   Prescreen: >3 drinks/day or >7 drinks/week? No       Last PSA: No results found for: PSA    Reviewed orders with  "patient. Reviewed health maintenance and updated orders accordingly - Yes    Reviewed and updated as needed this visit by clinical staff   Tobacco  Allergies  Meds  Problems  Med Hx  Surg Hx  Fam Hx          Reviewed and updated as needed this visit by Provider   Tobacco  Allergies  Meds  Problems  Med Hx  Surg Hx  Fam Hx           Review of Systems   Constitutional: Negative for chills and fever.   HENT: Negative for congestion, ear pain, hearing loss and sore throat.    Eyes: Negative for pain and visual disturbance.   Respiratory: Negative for cough and shortness of breath.    Cardiovascular: Negative for chest pain, palpitations and peripheral edema.   Gastrointestinal: Negative for abdominal pain, constipation, diarrhea, heartburn, hematochezia and nausea.   Genitourinary: Negative for dysuria, frequency, genital sores, hematuria, impotence, penile discharge and urgency.   Musculoskeletal: Negative for arthralgias, joint swelling and myalgias.   Skin: Negative for rash.   Neurological: Negative for dizziness, weakness, headaches and paresthesias.   Psychiatric/Behavioral: Negative for mood changes. The patient is not nervous/anxious.        OBJECTIVE:   /70 (BP Location: Right arm, Patient Position: Chair, Cuff Size: Adult Regular)   Pulse 56   Temp 97.2  F (36.2  C) (Tympanic)   Resp 20   Ht 1.962 m (6' 5.25\")   Wt 107 kg (236 lb)   SpO2 98%   BMI 27.80 kg/m      Physical Exam  GENERAL: healthy, alert and no distress  EYES: Eyes grossly normal to inspection, PERRL and conjunctivae and sclerae normal  HENT: ear canals and TM's normal, nose and mouth without ulcers or lesions  NECK: no adenopathy, no asymmetry, masses, or scars and thyroid normal to palpation  RESP: lungs clear to auscultation - no rales, rhonchi or wheezes  CV: regular rate and rhythm, normal S1 S2, no S3 or S4, no murmur, click or rub, no peripheral edema and peripheral pulses strong  ABDOMEN: soft, nontender, no " hepatosplenomegaly, no masses and bowel sounds normal  MS: no gross musculoskeletal defects noted, no edema  SKIN: no suspicious lesions or rashes  NEURO: Normal strength and tone, mentation intact and speech normal  PSYCH: mentation appears normal, affect normal/bright      ASSESSMENT/PLAN:   Dmitriy was seen today for physical.    Diagnoses and all orders for this visit:    Routine general medical examination at a health care facility  Cholesterol: screen today fasting  Diabetes: screen today fasting  Colon Cancer: never screened, order placed for colonoscopy.   HIV screen: negative in past   Hepatitis C screen: negative   Diet/Exercise: very active with biking.   Tobacco: non-user.   -     CBC with Platelets & Differential  -     TSH with free T4 reflex  -     Basic metabolic panel    Screening for diabetes mellitus  Fasting BMP   Screen for colon cancer  -     Colonoscopy Screening  Referral; Future    Screening cholesterol level  -     Lipid panel reflex to direct LDL Fasting        Patient has been advised of split billing requirements and indicates understanding: Yes      COUNSELING:   Reviewed preventive health counseling, as reflected in patient instructions       Regular exercise       Healthy diet/nutrition       Colorectal cancer screening        He reports that he has never smoked. He has never used smokeless tobacco.            Greg Suárez DO  [facility]

## 2023-08-22 ENCOUNTER — TELEPHONE (OUTPATIENT)
Dept: FAMILY MEDICINE | Facility: CLINIC | Age: 48
End: 2023-08-22
Payer: COMMERCIAL

## 2023-08-22 NOTE — TELEPHONE ENCOUNTER
Patient Quality Outreach    Patient is due for the following:   Colon Cancer Screening    Next Steps:   Schedule colonoscopy    Type of outreach:    Sent [employer] message.      Questions for provider review:    None           Britt Gillespie, [facility]

## 2023-10-11 ENCOUNTER — ALLIED HEALTH/NURSE VISIT (OUTPATIENT)
Dept: FAMILY MEDICINE | Facility: CLINIC | Age: 48
End: 2023-10-11
Payer: COMMERCIAL

## 2023-10-11 DIAGNOSIS — Z23 NEEDS FLU SHOT: Primary | ICD-10-CM

## 2023-10-11 PROCEDURE — 99207 PR NO CHARGE NURSE ONLY: CPT

## 2023-10-11 PROCEDURE — 90686 IIV4 VACC NO PRSV 0.5 ML IM: CPT

## 2023-10-11 PROCEDURE — 90471 IMMUNIZATION ADMIN: CPT

## 2024-02-19 ENCOUNTER — TELEPHONE (OUTPATIENT)
Dept: FAMILY MEDICINE | Facility: CLINIC | Age: 49
End: 2024-02-19
Payer: COMMERCIAL

## 2024-02-19 NOTE — TELEPHONE ENCOUNTER
Patient Quality Outreach    Patient is due for the following:   Colon Cancer Screening    Next Steps:   Schedule colonoscopy    Type of outreach:    Sent "[employer] message.      Questions for provider review:    None           Britt Gillespie, [facility]

## 2024-04-19 ENCOUNTER — MYC MEDICAL ADVICE (OUTPATIENT)
Dept: DERMATOLOGY | Facility: CLINIC | Age: 49
End: 2024-04-19
Payer: COMMERCIAL

## 2024-04-26 ENCOUNTER — OFFICE VISIT (OUTPATIENT)
Dept: DERMATOLOGY | Facility: CLINIC | Age: 49
End: 2024-04-26
Payer: COMMERCIAL

## 2024-04-26 DIAGNOSIS — D49.2 NEOPLASM OF SKIN: ICD-10-CM

## 2024-04-26 DIAGNOSIS — L82.1 SEBORRHEIC KERATOSIS: ICD-10-CM

## 2024-04-26 DIAGNOSIS — L82.1 VERRUCOUS KERATOSIS: Primary | ICD-10-CM

## 2024-04-26 DIAGNOSIS — D22.39 FIBROUS PAPULE OF NOSE: ICD-10-CM

## 2024-04-26 DIAGNOSIS — L73.8 SENILE SEBACEOUS GLAND HYPERPLASIA: ICD-10-CM

## 2024-04-26 DIAGNOSIS — D18.01 CHERRY ANGIOMA: ICD-10-CM

## 2024-04-26 DIAGNOSIS — L81.4 SOLAR LENTIGO: ICD-10-CM

## 2024-04-26 DIAGNOSIS — B07.9 VIRAL WARTS, UNSPECIFIED TYPE: ICD-10-CM

## 2024-04-26 DIAGNOSIS — D22.9 MULTIPLE BENIGN NEVI: ICD-10-CM

## 2024-04-26 PROCEDURE — 99203 OFFICE O/P NEW LOW 30 MIN: CPT | Mod: 25 | Performed by: DERMATOLOGY

## 2024-04-26 PROCEDURE — 88305 TISSUE EXAM BY PATHOLOGIST: CPT | Mod: 26 | Performed by: DERMATOLOGY

## 2024-04-26 PROCEDURE — 88305 TISSUE EXAM BY PATHOLOGIST: CPT | Mod: TC | Performed by: DERMATOLOGY

## 2024-04-26 PROCEDURE — 11102 TANGNTL BX SKIN SINGLE LES: CPT | Mod: XU | Performed by: DERMATOLOGY

## 2024-04-26 PROCEDURE — 17110 DESTRUCTION B9 LES UP TO 14: CPT | Performed by: DERMATOLOGY

## 2024-04-26 ASSESSMENT — PAIN SCALES - GENERAL: PAINLEVEL: NO PAIN (0)

## 2024-04-26 NOTE — NURSING NOTE
Chief Complaint   Patient presents with    Skin Check     Patient reports lesions of concern on their face starting in March. The patient reports some itching of these lesions but denies pain. The patient would like a FBSE.     Ana Berry LPN

## 2024-04-26 NOTE — PATIENT INSTRUCTIONS
Consider getting the HPV vaccine. Unfortunately this wouldn't be covered by your insurance.    WART TREATMENT:  I recommend using an over-the-counter wart remover. The active ingredient is salicyclic acid.     Liquid wart removers are better for smaller or more numerous warts. All of these contain 17% salicylic acid. Brands include Compound W and Duofilm. Apply to warts after showering or soaking them, then cover with duct tape. This can be left on for 24-48 hours, then removed. File off the white soft skin with a nail file, emery board, or pumice stone, then replace the liquid and tape.     Larger single or few warts may respond better to a plaster with 40% salicylic acid, brand name Mediplast. You may need to ask the pharmacist for this, but it is available without a prescription. Cut it to the size of your wart. Tape it on to the wart. After 24-48 hours, remove it. File off the white soft skin with a disposable nail file or pumice stone. Do not use the nail file on any other places, as it can spread the wart. Repeat until wart resolves.     Try to remember to leave the liquid or plaster off of the wart(s) for about 24 hours before any followup appointment, so that it is easier to see where the wart remains.     Wound Care After a Biopsy    What is a skin biopsy?  A skin biopsy allows the doctor to examine a very small piece of tissue under the microscope to determine the diagnosis and the best treatment for the skin condition. A local anesthetic (numbing medicine) is injected with a very small needle into the skin area to be tested. A small piece of skin is taken from the area. Sometimes a suture (stitch) is used.     What are the risks of a skin biopsy?  I will experience scar, bleeding, swelling, pain, crusting and redness. I may experience incomplete removal or recurrence. Risks of this procedure are excessive bleeding, bruising, infection, nerve damage, numbness, thick (hypertrophic or keloidal) scar and  non-diagnostic biopsy.    How should I care for my wound for the first 24 hours?  Keep the wound dry and covered for 24 hours  If it bleeds, hold direct pressure on the area for 15 minutes. If bleeding does not stop, call us or go to the emergency room  Avoid strenuous exercise the first 1-2 days or as your doctor instructs you    How should I care for the wound after 24 hours?  After 24 hours, remove the bandage  You may bathe or shower as normal  If you had a scalp biopsy, you can shampoo as usual and can use shower water to clean the biopsy site daily  Clean the wound once a day with gentle soap and water  Do not scrub, be gentle  Apply white petroleum/Vaseline after cleaning the wound with a cotton swab or a clean finger, and keep the site covered with a Bandaid /bandage. Bandages are not necessary with a scalp biopsy  If you are unable to cover the site with a Bandaid /bandage, re-apply ointment 2-3 times a day to keep the site moist. Moisture will help with healing  Avoid strenuous activity for first 1-2 days  Avoid lakes, rivers, pools, and oceans until the stitches are removed or the site is healed    How do I clean my wound?  Wash hands thoroughly with soap or use hand  before all wound care  Clean the wound with gentle soap and water  Apply white petroleum/Vaseline  to wound after it is clean  Replace the Bandaid /bandage to keep the wound covered for the first few days or as instructed by your doctor  If you had a scalp biopsy, warm shower water to the area on a daily basis should suffice    What should I use to clean my wound?   Cotton-tipped applicators (Qtips )  White petroleum jelly (Vaseline ). Use a clean new container and use Q-tips to apply.  Bandaids  as needed  Gentle soap     How should I care for my wound long term?  Do not get your wound dirty  Keep up with wound care for one week or until the area is healed.  If you have stitches, stitches need to be removed in 14 days. You may  return to our clinic for this or you may have it done locally at your doctor s office.  A small scab will form and fall off by itself when the area is completely healed. The area will be red and will become pink in color as it heals. Sun protection is very important for how your scar will turn out. Sunscreen with an SPF 30 or greater is recommended once the area is healed.  You should have some soreness but it should be mild and slowly go away over several days. Talk to your doctor about using tylenol for pain,    When should I call my doctor?  If you have increased:   Pain or swelling  Pus or drainage (clear or slightly yellow drainage is ok)  Temperature over 100F  Spreading redness or warmth around wound    When will I hear about my results?  The biopsy results can take 2 weeks to come back.  Your results will automatically release to [facility] before your provider has even reviewed them.  The clinic will call you with the results, send you a [facility] message, or have you schedule a follow-up clinic or phone time to discuss the results.  Contact our clinics if you do not hear from us in 2 weeks.    Who should I call with questions?  [facility]: [phone]  [facility]: [phone]  For urgent needs outside of business hours call the [facility] at [phone] and ask for the dermatology resident on call

## 2024-04-26 NOTE — PROGRESS NOTES
[facility] Dermatology Note  Encounter Date: Apr 26, 2024  Office Visit     Dermatology Problem List:  1. NUB, R nasal ala, probable fibrous papule s/p shave removal 4/26/24  2. Verrucous keratosis, L nasal tip  - LN2 4/26/24  3. Warts, L plantar foot  - LN2 4/26/24, home sal acid  ____________________________________________    Assessment & Plan:    # NUB, R nasal ala  Longstanding bump that is growing over time and becoming symptomatic/irritated. Favor inflamed fibrous papule.  - Shave removal, see procedure note    # Verrucous keratosis, L nasal tip  AK on ddx but less favored given clinical appearance today. Offered LN2 vs shave removal, opted for the former. Discussed he may need more than 1 treatment and possibly a biopsy in the future if it doesn't resolve.  - Cryotherapy, see procedure note  - he will let us know if does not resolve and can please add him onto clinic for treatment    # Benign skin findings include seborrheic keratosis, lentigines, benign melanocytic nevi, cherry angiomas, and sebaceous hyperplasia  Lesions benign nature reviewed, no treatment is indicated at this time, will monitor for any clinical changes    # Seborrheic keratoses, irritated - x1 on face  Benign nature reviewed. Bothersome so opted to freeze.  - Cryotherapy, see procedure note    # Verruca vulgaris  Counseled on natural history and viral etiology of this condition; counseled on recalcitrant course; advised best results observed with combination of in-office and at-home treatments  - Cryotherapy performed, see procedure note  - Salicylic acid at home, counseled on use  - advised to consider HPV vaccine    Procedures Performed:   - Cryotherapy procedure note, location(s): L plantar foot (warts x2), L cheek (SK x1), L nasal tip (verrucous keratosis x1). After verbal consent and discussion of risks and benefits including, but not limited to, dyspigmentation/scar, blister, and pain, 4 lesion(s) was(were)  treated with 1-2 mm freeze border for 1-2 cycles with liquid nitrogen. Post cryotherapy instructions were provided.  - Shave bx procedure note, location(s): R nasal ala. After discussion of benefits and risks including but not limited to bleeding, infection, scar, incomplete removal, recurrence, and non-diagnostic biopsy, verbal consent photographs were obtained. The area was cleaned with isopropyl alcohol. 0.5mL of 1% lidocaine with epinephrine was injected to obtain adequate anesthesia of lesion(s). Shave biopsy at site(s) performed. Hemostasis was achieved with aluminium chloride. Petrolatum ointment and a sterile dressing were applied. The patient tolerated the procedure and no complications were noted. The patient was provided with verbal and written post care instructions.     Follow-up: 1 year(s) in-person, or earlier for new or changing lesions    Staff Involved:   Scribe/Staff/Resident    Scribe Disclosure:   I, JOSE MOJICA, am serving as a scribe; to document services personally performed by Shama Zimmerman MD -based on data collection and the provider's statements to me.     Sepideh Christine MD  Dermatology Resident PGY4    Staff Physician Comments:   I saw and evaluated the patient with the resident and I agree with the assessment and plan.  I was present for the entire minor, key portions of the above major procedure and examination.    Shama Zimmerman MD    Department of Dermatology  [facility]: Phone: [phone], Fax: [phone]  5/2/2024     _______________________________________    CC: Skin Check (Patient reports lesions of concern on their face starting in March. The patient reports some itching of these lesions but denies pain. The patient would like a FBSE.)    HPI:  Mr. Dmitriy Saul is a(n) 48 year old male who presents today as a new patient for spot check. Today he reports some lesions of  concern. His main concerns are spots on the nose. A newer spot on the L nasal tip that recently is rough and has sloughed more. Also has a longstanding spot on the R side of the nose that he was told was benign previously. He desires removal of this spot. Also has warts on the L plantar foot. No personal hx of skin cancer. 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 Family hx of many BCC in grandfather but no 1st degree relative with melanoma.     Patient is otherwise feeling well, without additional skin concerns.    Labs Reviewed:  N/A    Physical Exam:  Vitals: There were no vitals taken for this visit.  SKIN: Full body skin exam excluding the genitals was performed including face, scalp, neck, ears, chest, back, bilateral arms, hands, bilateral legs, feet, and buttocks.   - Firm pink uniform papule on R nasal ala  - Verrucous ~2 mm papule on the L nasal tip  - Scattered pink papules on the forehead with yellow globules within and a central dell  - There are dome shaped bright red papules on the trunk.   - Multiple regular brown pigmented macules and papules are identified on the trunk and extremities.   - There are waxy stuck on tan to brown papules on the trunk and face.  - Verrucous papules on the L plantar foot x2  - No other lesions of concern on areas examined.     Medications:  Current Outpatient Medications   Medication Sig Dispense Refill    olopatadine (PATANOL) 0.1 % ophthalmic solution 1 drop 2 times daily       No current facility-administered medications for this visit.      Past Medical History:   Patient Active Problem List   Diagnosis    History of dysplastic nevus    Hyperlipidemia    Calculus of kidney     Past Medical History:   Diagnosis Date    Calculus of kidney         CC Referred Self, MD  No address on file on close of this encounter.

## 2024-04-26 NOTE — NURSING NOTE
Lidocaine-epinephrine 1-1:161631 % injection   0.1 mL once for one use, starting 4/26/2024 ending 4/26/2024,  2mL disp, R-0, injection  Injected by Ana Berry LPN

## 2024-04-26 NOTE — LETTER
4/26/2024       RE: Dmitriy Saul  [id number] [address] [address]     Dear Colleague,    Thank you for referring your patient, Dmitriy Saul, to the [facility] [place] at [facility]. Please see a copy of my visit note below.    [facility] Dermatology Note  Encounter Date: Apr 26, 2024  Office Visit     Dermatology Problem List:  1. NUB, R nasal ala, probable fibrous papule s/p shave removal 4/26/24  2. Verrucous keratosis, L nasal tip  - LN2 4/26/24  3. Warts, L plantar foot  - LN2 4/26/24, home sal acid  ____________________________________________    Assessment & Plan:    # NUB, R nasal ala  Longstanding bump that is growing over time and becoming symptomatic/irritated. Favor inflamed fibrous papule.  - Shave removal, see procedure note    # Verrucous keratosis, L nasal tip  AK on ddx but less favored given clinical appearance today. Offered LN2 vs shave removal, opted for the former. Discussed he may need more than 1 treatment and possibly a biopsy in the future if it doesn't resolve.  - Cryotherapy, see procedure note  - he will let us know if does not resolve and can please add him onto clinic for treatment    # Benign skin findings include seborrheic keratosis, lentigines, benign melanocytic nevi, cherry angiomas, and sebaceous hyperplasia  Lesions benign nature reviewed, no treatment is indicated at this time, will monitor for any clinical changes    # Seborrheic keratoses, irritated - x1 on face  Benign nature reviewed. Bothersome so opted to freeze.  - Cryotherapy, see procedure note    # Verruca vulgaris  Counseled on natural history and viral etiology of this condition; counseled on recalcitrant course; advised best results observed with combination of in-office and at-home treatments  - Cryotherapy performed, see procedure note  - Salicylic acid at home, counseled on use  - advised to consider  HPV vaccine    Procedures Performed:   - Cryotherapy procedure note, location(s): L plantar foot (warts x2), L cheek (SK x1), L nasal tip (verrucous keratosis x1). After verbal consent and discussion of risks and benefits including, but not limited to, dyspigmentation/scar, blister, and pain, 4 lesion(s) was(were) treated with 1-2 mm freeze border for 1-2 cycles with liquid nitrogen. Post cryotherapy instructions were provided.  - Shave bx procedure note, location(s): R nasal ala. After discussion of benefits and risks including but not limited to bleeding, infection, scar, incomplete removal, recurrence, and non-diagnostic biopsy, verbal consent photographs were obtained. The area was cleaned with isopropyl alcohol. 0.5mL of 1% lidocaine with epinephrine was injected to obtain adequate anesthesia of lesion(s). Shave biopsy at site(s) performed. Hemostasis was achieved with aluminium chloride. Petrolatum ointment and a sterile dressing were applied. The patient tolerated the procedure and no complications were noted. The patient was provided with verbal and written post care instructions.     Follow-up: 1 year(s) in-person, or earlier for new or changing lesions    Staff Involved:   Scribe/Staff/Resident    Scribe Disclosure:   I, JOSE MOJICA, am serving as a scribe; to document services personally performed by Shama Zimmerman MD -based on data collection and the provider's statements to me.     Sepideh Christine MD  Dermatology Resident PGY4    Staff Physician Comments:   I saw and evaluated the patient with the resident and I agree with the assessment and plan.  I was present for the entire minor, key portions of the above major procedure and examination.    Shama Zimmerman MD    Department of Dermatology  [facility]: Phone: [phone], Fax: [phone]  5/2/2024     _______________________________________    CC:  Skin Check (Patient reports lesions of concern on their face starting in March. The patient reports some itching of these lesions but denies pain. The patient would like a FBSE.)    HPI:  Mr. Dmitriy Saul is a(n) 48 year old male who presents today as a new patient for spot check. Today he reports some lesions of concern. His main concerns are spots on the nose. A newer spot on the L nasal tip that recently is rough and has sloughed more. Also has a longstanding spot on the R side of the nose that he was told was benign previously. He desires removal of this spot. Also has warts on the L plantar foot. No personal hx of skin cancer. 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 Family hx of many BCC in grandfather but no 1st degree relative with melanoma.     Patient is otherwise feeling well, without additional skin concerns.    Labs Reviewed:  N/A    Physical Exam:  Vitals: There were no vitals taken for this visit.  SKIN: Full body skin exam excluding the genitals was performed including face, scalp, neck, ears, chest, back, bilateral arms, hands, bilateral legs, feet, and buttocks.   - Firm pink uniform papule on R nasal ala  - Verrucous ~2 mm papule on the L nasal tip  - Scattered pink papules on the forehead with yellow globules within and a central dell  - There are dome shaped bright red papules on the trunk.   - Multiple regular brown pigmented macules and papules are identified on the trunk and extremities.   - There are waxy stuck on tan to brown papules on the trunk and face.  - Verrucous papules on the L plantar foot x2  - No other lesions of concern on areas examined.     Medications:  Current Outpatient Medications   Medication Sig Dispense Refill    olopatadine (PATANOL) 0.1 % ophthalmic solution 1 drop 2 times daily       No current facility-administered medications for this visit.      Past Medical History:   Patient Active Problem List   Diagnosis    History of dysplastic nevus     Hyperlipidemia    Calculus of kidney     Past Medical History:   Diagnosis Date    Calculus of kidney         CC Referred Self,

## 2024-04-29 LAB
PATH REPORT.COMMENTS IMP SPEC: NORMAL
PATH REPORT.COMMENTS IMP SPEC: NORMAL
PATH REPORT.FINAL DX SPEC: NORMAL
PATH REPORT.GROSS SPEC: NORMAL
PATH REPORT.MICROSCOPIC SPEC OTHER STN: NORMAL
PATH REPORT.RELEVANT HX SPEC: NORMAL

## 2024-05-07 ENCOUNTER — MYC MEDICAL ADVICE (OUTPATIENT)
Dept: DERMATOLOGY | Facility: CLINIC | Age: 49
End: 2024-05-07
Payer: COMMERCIAL

## 2024-05-20 ENCOUNTER — MYC MEDICAL ADVICE (OUTPATIENT)
Dept: DERMATOLOGY | Facility: CLINIC | Age: 49
End: 2024-05-20
Payer: COMMERCIAL

## 2024-05-29 ENCOUNTER — OFFICE VISIT (OUTPATIENT)
Dept: DERMATOLOGY | Facility: CLINIC | Age: 49
End: 2024-05-29
Payer: COMMERCIAL

## 2024-05-29 DIAGNOSIS — B07.0 PLANTAR WART: ICD-10-CM

## 2024-05-29 DIAGNOSIS — L82.1 VERRUCOUS KERATOSIS: Primary | ICD-10-CM

## 2024-05-29 PROCEDURE — 11900 INJECT SKIN LESIONS </W 7: CPT | Mod: 59 | Performed by: DERMATOLOGY

## 2024-05-29 PROCEDURE — 17110 DESTRUCTION B9 LES UP TO 14: CPT | Performed by: DERMATOLOGY

## 2024-05-29 NOTE — NURSING NOTE
Drug Administration Record    Prior to injection, verified patient identity using patient's name and date of birth.  Due to injection administration, patient instructed to remain in clinic for 15 minutes  afterwards, and to report any adverse reaction to me immediately.    Drug Name: candida antigen  Dose: 0.3 mL  Route administered: ID  NDC #: 73775-307-34  Amount of waste(mL):0.7 mL  Reason for waste: Single use vial    LOT #:   SITE: See chart note  : [employer]  EXPIRATION DATE: 09/20/2024

## 2024-05-29 NOTE — NURSING NOTE
Dermatology Rooming Note    Dmitriy Saul's goals for this visit include:   Chief Complaint   Patient presents with    Derm Problem     Dmitriy is here today for a 1 month recheck of Verrucous keratosis on L nasal tip and wart recheck.        Keron STONE, CMA

## 2024-05-29 NOTE — PROGRESS NOTES
[facility] Dermatology Note  Encounter Date: May 29, 2024  Office Visit      Dermatology Problem List:  1. Fibrous papule, R nasal ala, s/p shave bx 4/26/24  2. Verrucous keratosis, L nasal tip  - LN2 4/26/24, 5/29/24  3. Warts, L plantar foot  - LN2 4/26/24, 5/29/24  - Candida 5/29/24  - Home sal acid  ____________________________________________     Assessment & Plan:     # Verrucous keratosis, L nasal tip. Performed cryo with imprvement but then recurrence. Discussed option to repeat cryo today v bx, will plan for cryo today and will consider a biopsy in the future if it doesn't resolve.  - Cryotherapy, see procedure note  -  plan bx next visit if not resolved     # Verrucous papule, L plantar foot x 2  - Cryotherapy performed, see procedure note  - Inject candida today, see procedure note   - continue salicylic acid at home, counseled on use  - prev advised to consider HPV vaccine but would be out of pocket as >44 yo    Procedures Performed:   - Cryotherapy procedure note, location(s): see above. After verbal consent and discussion of risks and benefits including, but not limited to, dyspigmentation/scar, blister, and pain, 3 lesion(s) was(were) treated with 1-2 mm freeze border for 1-2 cycles with liquid nitrogen. Post cryotherapy instructions were provided.  - Intra-lesional candida injection procedure note. Discussion had with patient regarding candida antigen injection as potential treatment option and verbal consent obtained. After positioning and cleansing with isopropyl alcohol, 0.3 total mL of candida albicans antigen was injected into 1 lesion(s) on the L plantar foot. The patient tolerated the procedure well and left the dermatology clinic in good condition.    Follow-up: 4-6 week(s) in-person, or earlier for new or changing lesions    Staff and Scribe:     Scribe Disclosure:   DONOVAN VELÁZQUEZ, am serving as a scribe; to document services personally performed by Shama Zimmerman  MD-based on data collection and the provider's statements to me.      Provider Disclosure:   The documentation recorded by the scribe accurately reflects the services I personally performed and the decisions made by me.    Shama Zimmerman MD    Department of Dermatology  [facility]: Phone: [phone], Fax: [phone]  6/4/2024     ____________________________________________    CC: Derm Problem (Dmitriy is here today for a 1 month recheck of Verrucous keratosis on L nasal tip and wart recheck.)    HPI:  Mr. Dmitriy Saul is a(n) 49 year old male who presents today as a return patient for verrucous keratosis on L nasal tip.    Today the patient mentions the biopsy site is doing well. He mentions the verrucous keratosis on the L nasal tip came back bigger.    Patient is otherwise feeling well, without additional skin concerns.    Labs Reviewed:  4/26/24  Final Diagnosis  Right nasal ala:  - Fibrous papule    Physical Exam:  Vitals: There were no vitals taken for this visit.  SKIN: Focused examination of below areas was performed.  - Filiform hyperkeratotic papule on the L nasal tip  - Verrucous papules on the L plantar foot x 2  - No other lesions of concern on areas examined.     Medications:  Current Outpatient Medications   Medication Sig Dispense Refill    olopatadine (PATANOL) 0.1 % ophthalmic solution 1 drop 2 times daily       No current facility-administered medications for this visit.      Past Medical History:   Patient Active Problem List   Diagnosis    History of dysplastic nevus    Hyperlipidemia    Calculus of kidney     Past Medical History:   Diagnosis Date    Calculus of kidney         CC No referring provider defined for this encounter. on close of this encounter.

## 2024-05-29 NOTE — LETTER
5/29/2024       RE: Dmitriy Saul  [address] [facility] [address]     Dear Colleague,    Thank you for referring your patient, Dmitriy Saul, to the [facility] [place] at [facility]. Please see a copy of my visit note below.    [facility] Note  Encounter Date: May 29, 2024  Office Visit      Dermatology Problem List:  1. Fibrous papule, R nasal ala, s/p shave bx 4/26/24  2. Verrucous keratosis, L nasal tip  - LN2 4/26/24, 5/29/24  3. Warts, L plantar foot  - LN2 4/26/24, 5/29/24  - Candida 5/29/24  - Home sal acid  ____________________________________________     Assessment & Plan:     # Verrucous keratosis, L nasal tip. Performed cryo with imprvement but then recurrence. Discussed option to repeat cryo today v bx, will plan for cryo today and will consider a biopsy in the future if it doesn't resolve.  - Cryotherapy, see procedure note  -  plan bx next visit if not resolved     # Verrucous papule, L plantar foot x 2  - Cryotherapy performed, see procedure note  - Inject candida today, see procedure note   - continue salicylic acid at home, counseled on use  - prev advised to consider HPV vaccine but would be out of pocket as >46 yo    Procedures Performed:   - Cryotherapy procedure note, location(s): see above. After verbal consent and discussion of risks and benefits including, but not limited to, dyspigmentation/scar, blister, and pain, 3 lesion(s) was(were) treated with 1-2 mm freeze border for 1-2 cycles with liquid nitrogen. Post cryotherapy instructions were provided.  - Intra-lesional candida injection procedure note. Discussion had with patient regarding candida antigen injection as potential treatment option and verbal consent obtained. After positioning and cleansing with isopropyl alcohol, 0.3 total mL of candida albicans antigen was injected into 1 lesion(s) on the L plantar foot.  The patient tolerated the procedure well and left the dermatology clinic in good condition.    Follow-up: 4-6 week(s) in-person, or earlier for new or changing lesions    Staff and Scribe:     Scribe Disclosure:   I, DONOVAN GOODSONBLAKEEBER, am serving as a scribe; to document services personally performed by Shama Zimmerman MD-based on data collection and the provider's statements to me.      Provider Disclosure:   The documentation recorded by the scribe accurately reflects the services I personally performed and the decisions made by me.    Shama Zimmerman MD    Department of Dermatology  [facility]: Phone: [phone], Fax: [phone]  6/4/2024     ____________________________________________    CC: Derm Problem (Dmitriy is here today for a 1 month recheck of Verrucous keratosis on L nasal tip and wart recheck.)    HPI:  Mr. Dmitriy Saul is a(n) 49 year old male who presents today as a return patient for verrucous keratosis on L nasal tip.    Today the patient mentions the biopsy site is doing well. He mentions the verrucous keratosis on the L nasal tip came back bigger.    Patient is otherwise feeling well, without additional skin concerns.    Labs Reviewed:  4/26/24  Final Diagnosis  Right nasal ala:  - Fibrous papule    Physical Exam:  Vitals: There were no vitals taken for this visit.  SKIN: Focused examination of below areas was performed.  - Filiform hyperkeratotic papule on the L nasal tip  - Verrucous papules on the L plantar foot x 2  - No other lesions of concern on areas examined.     Medications:  Current Outpatient Medications   Medication Sig Dispense Refill    olopatadine (PATANOL) 0.1 % ophthalmic solution 1 drop 2 times daily       No current facility-administered medications for this visit.      Past Medical History:   Patient Active Problem List   Diagnosis    History of dysplastic nevus     Hyperlipidemia    Calculus of kidney     Past Medical History:   Diagnosis Date    Calculus of kidney         CC No referring provider

## 2024-06-04 RX ORDER — CANDIDA ALBICANS 1000 [PNU]/ML
0.3 INJECTION, SOLUTION INTRADERMAL ONCE
Status: ACTIVE | OUTPATIENT
Start: 2024-06-04

## 2024-06-11 ENCOUNTER — OFFICE VISIT (OUTPATIENT)
Dept: FAMILY MEDICINE | Facility: CLINIC | Age: 49
End: 2024-06-11
Payer: COMMERCIAL

## 2024-06-11 VITALS
DIASTOLIC BLOOD PRESSURE: 72 MMHG | SYSTOLIC BLOOD PRESSURE: 121 MMHG | HEIGHT: 76 IN | WEIGHT: 240 LBS | TEMPERATURE: 97.8 F | RESPIRATION RATE: 20 BRPM | HEART RATE: 46 BPM | OXYGEN SATURATION: 100 % | BODY MASS INDEX: 29.22 KG/M2

## 2024-06-11 DIAGNOSIS — Z00.00 ROUTINE GENERAL MEDICAL EXAMINATION AT A HEALTH CARE FACILITY: Primary | ICD-10-CM

## 2024-06-11 DIAGNOSIS — Z12.11 SCREEN FOR COLON CANCER: ICD-10-CM

## 2024-06-11 DIAGNOSIS — E78.5 HYPERLIPIDEMIA, UNSPECIFIED HYPERLIPIDEMIA TYPE: ICD-10-CM

## 2024-06-11 DIAGNOSIS — Z87.442 HISTORY OF RENAL STONE: ICD-10-CM

## 2024-06-11 DIAGNOSIS — Z13.1 SCREENING FOR DIABETES MELLITUS: ICD-10-CM

## 2024-06-11 LAB
CHOLEST SERPL-MCNC: 217 MG/DL
FASTING STATUS PATIENT QL REPORTED: YES
HBA1C MFR BLD: 5.6 % (ref 0–5.6)
HDLC SERPL-MCNC: 36 MG/DL
LDLC SERPL CALC-MCNC: 150 MG/DL
NONHDLC SERPL-MCNC: 181 MG/DL
TRIGL SERPL-MCNC: 157 MG/DL

## 2024-06-11 PROCEDURE — 83036 HEMOGLOBIN GLYCOSYLATED A1C: CPT | Performed by: FAMILY MEDICINE

## 2024-06-11 PROCEDURE — 99396 PREV VISIT EST AGE 40-64: CPT | Performed by: FAMILY MEDICINE

## 2024-06-11 PROCEDURE — 80061 LIPID PANEL: CPT | Performed by: FAMILY MEDICINE

## 2024-06-11 PROCEDURE — 36415 COLL VENOUS BLD VENIPUNCTURE: CPT | Performed by: FAMILY MEDICINE

## 2024-06-11 RX ORDER — CETIRIZINE HYDROCHLORIDE 10 MG/1
10 TABLET ORAL DAILY PRN
COMMUNITY

## 2024-06-11 SDOH — HEALTH STABILITY: PHYSICAL HEALTH: ON AVERAGE, HOW MANY DAYS PER WEEK DO YOU ENGAGE IN MODERATE TO STRENUOUS EXERCISE (LIKE A BRISK WALK)?: 7 DAYS

## 2024-06-11 ASSESSMENT — SOCIAL DETERMINANTS OF HEALTH (SDOH): HOW OFTEN DO YOU GET TOGETHER WITH FRIENDS OR RELATIVES?: MORE THAN THREE TIMES A WEEK

## 2024-06-11 ASSESSMENT — PAIN SCALES - GENERAL: PAINLEVEL: NO PAIN (0)

## 2024-06-11 NOTE — PROGRESS NOTES
"Preventive Care Visit  [facility]  Greg Suárez DO, Family Medicine  Jun 11, 2024      Assessment & Plan     Routine general medical examination at a health care facility  Cholesterol: screen today   Diabetes: screen today   Colon Cancer: needs to be screened. Colonoscopy ordered wishes to have this completed at [facility].   Diet/Exercise: very active with biking.   Tobacco: non-user.     Screening for diabetes mellitus  - Hemoglobin A1c    Screen for colon cancer  Wishes to have this done at [facility]. Referral placed.   - Colonoscopy Screening  Referral    Hyperlipidemia, unspecified hyperlipidemia type  Check cholesterol today.   - Lipid panel reflex to direct LDL Fasting    History of renal stone  Wishes to see Urology. History of lithotripsy back in 2006. Occasional discomfort in the flank. Referral placed.   - Adult Urology  Referral      Patient has been advised of split billing requirements and indicates understanding: Yes    The risks, benefits and treatment options of prescribed medications or other treatments have been discussed with the patient. The patient verbalized their understanding and should call or follow up if no improvement or if they develop further problems.        BMI  Estimated body mass index is 28.93 kg/m  as calculated from the following:    Height as of this encounter: 1.94 m (6' 4.38\").    Weight as of this encounter: 108.9 kg (240 lb).   Weight management plan: Discussed healthy diet and exercise guidelines    Counseling  Appropriate preventive services were discussed with this patient, including applicable screening as appropriate for fall prevention, nutrition, physical activity, Tobacco-use cessation, weight loss and cognition.  Checklist reviewing preventive services available has been given to the patient.  Reviewed patient's diet, addressing concerns and/or questions.         Jordin Izaguirre is a 49 year old, presenting for the " following:  Physical        6/11/2024    10:39 AM   Additional Questions   Roomed by Britt LOMELI        Health Care Directive  Patient does not have a Health Care Directive or Living Will:    HPI    49 year old male who presents to clinic for annual exam.       Cholesterol: screen today   Diabetes: screen today   Colon Cancer: needs to be screened. Colonoscopy ordered wishes to have this completed at [facility].   Diet/Exercise: very active with biking.   Tobacco: non-user.       Concerns for potential kidney stone pain.   History of recurrent kidney stones in the past.   Underwent lithotripsy in 2006.   Tries to stay well hydrated but very active with cycling.   Wishes to see Urology for more information regarding kidney stone prevention.       The 10-year ASCVD risk score (Jaden GARCIA, et al., 2019) is: 4.8%    Values used to calculate the score:      Age: 49 years      Sex: Male      Is Non- : No      Diabetic: No      Tobacco smoker: No      Systolic Blood Pressure: 121 mmHg      Is BP treated: No      HDL Cholesterol: 32 mg/dL      Total Cholesterol: 217 mg/dL          6/11/2024   General Health   How would you rate your overall physical health? Excellent   Feel stress (tense, anxious, or unable to sleep) Not at all         6/11/2024   Nutrition   Three or more servings of calcium each day? Yes   Diet: Regular (no restrictions)   How many servings of fruit and vegetables per day? (!) 2-3   How many sweetened beverages each day? (!) 2         6/11/2024   Exercise   Days per week of moderate/strenous exercise 7 days         6/11/2024   Social Factors   Frequency of gathering with friends or relatives More than three times a week   Worry food won't last until get money to buy more No   Food not last or not have enough money for food? No   Do you have housing?  Yes   Are you worried about losing your housing? No   Lack of transportation? No   Unable to get utilities (heat,electricity)? No          "6/11/2024   Dental   Dentist two times every year? Yes         6/11/2024   TB Screening   Were you born outside of the [place]? No         Today's PHQ-2 Score:       6/11/2024    10:31 AM   PHQ-2 ( 1999 [employer])   Q1: Little interest or pleasure in doing things 0   Q2: Feeling down, depressed or hopeless 0   PHQ-2 Score 0   Q1: Little interest or pleasure in doing things Not at all   Q2: Feeling down, depressed or hopeless Not at all   PHQ-2 Score 0           6/11/2024   Substance Use   Alcohol more than 3/day or more than 7/wk No   Do you use any other substances recreationally? No     Social History     Tobacco Use    Smoking status: Never    Smokeless tobacco: Never   Vaping Use    Vaping status: Never Used   Substance Use Topics    Alcohol use: Yes     Comment: occ    Drug use: No           6/11/2024   STI Screening   New sexual partner(s) since last STI/HIV test? No   ASCVD Risk   The 10-year ASCVD risk score (Jadne GARCIA, et al., 2019) is: 4.8%    Values used to calculate the score:      Age: 49 years      Sex: Male      Is Non- : No      Diabetic: No      Tobacco smoker: No      Systolic Blood Pressure: 121 mmHg      Is BP treated: No      HDL Cholesterol: 32 mg/dL      Total Cholesterol: 217 mg/dL        6/11/2024   Contraception/Family Planning   Questions about contraception or family planning No        Reviewed and updated as needed this visit by Provider                          Review of Systems  Constitutional, HEENT, cardiovascular, pulmonary, gi and gu systems are negative, except as otherwise noted.     Objective    Exam  /72 (BP Location: Right arm, Patient Position: Chair, Cuff Size: Adult Regular)   Pulse (!) 46   Temp 97.8  F (36.6  C) (Oral)   Resp 20   Ht 1.94 m (6' 4.38\")   Wt 108.9 kg (240 lb)   SpO2 100%   BMI 28.93 kg/m     Estimated body mass index is 28.93 kg/m  as calculated from the following:    Height as of this encounter: 1.94 m (6' 4.38\").    " Weight as of this encounter: 108.9 kg (240 lb).    Physical Exam  GENERAL: alert and no distress  EYES: Eyes grossly normal to inspection, PERRL and conjunctivae and sclerae normal  HENT: ear canals and TM's normal, nose and mouth without ulcers or lesions  NECK: no adenopathy, no asymmetry, masses, or scars  RESP: lungs clear to auscultation - no rales, rhonchi or wheezes  CV: regular rate and rhythm, normal S1 S2, no S3 or S4, no murmur, click or rub, no peripheral edema  ABDOMEN: soft, nontender, no hepatosplenomegaly, no masses and bowel sounds normal  MS: no gross musculoskeletal defects noted, no edema  SKIN: no suspicious lesions or rashes  NEURO: Normal strength and tone, mentation intact and speech normal  PSYCH: mentation appears normal, affect normal/bright        Signed Electronically by: Gerg Suárez DO

## 2024-06-11 NOTE — PATIENT INSTRUCTIONS
"Lab work today.   Schedule colonoscopy   Follow up with Urology for kidney stone prevention.       Preventive Care Advice   This is general advice we often give to help people stay healthy. Your care team may have specific advice just for you. Please talk to your care team about your own preventive care needs.  Lifestyle  Exercise at least 150 minutes each week (30 minutes a day, 5 days a week).  Do muscle strengthening activities 2 days a week. These help control your weight and prevent disease.  No smoking.  Wear sunscreen to prevent skin cancer.  Have your home tested for radon every 2 to 5 years. Radon is a colorless, odorless gas that can harm your lungs. To learn more, go to www.health.[facility].us and search for \"Radon in Homes.\"  Keep guns unloaded and locked up in a safe place like a safe or gun vault, or, use a gun lock and hide the keys. Always lock away bullets separately. To learn more, visit [employer].gov and search for \"safe gun storage.\"  Nutrition  Eat 5 or more servings of fruits and vegetables each day.  Try wheat bread, brown rice and whole grain pasta (instead of white bread, rice, and pasta).  Get enough calcium and vitamin D. Check the label on foods and aim for 100% of the RDA (recommended daily allowance).  Regular exams  Have a dental exam and cleaning every 6 months.  See your health care team every year to talk about:  Any changes in your health.  Any medicines your care team has prescribed.  Preventive care, family planning, and ways to prevent chronic diseases.  Shots (vaccines)   HPV shots (up to age 26), if you've never had them before.  Hepatitis B shots (up to age 59), if you've never had them before.  COVID-19 shot: Get this shot when it's due.  Flu shot: Get a flu shot every year.  Tetanus shot: Get a tetanus shot every 10 years.  Pneumococcal, hepatitis A, and RSV shots: Ask your care team if you need these based on your risk.  Shingles shot (for age 50 and up).  General health " tests  Diabetes screening:  Starting at age 35, Get screened for diabetes at least every 3 years.  If you are younger than age 35, ask your care team if you should be screened for diabetes.  Cholesterol test: At age 39, start having a cholesterol test every 5 years, or more often if advised.  Bone density scan (DEXA): At age 50, ask your care team if you should have this scan for osteoporosis (brittle bones).  Hepatitis C: Get tested at least once in your life.  Abdominal aortic aneurysm screening: Talk to your doctor about having this screening if you:  Have ever smoked; and  Are biologically male; and  Are between the ages of 65 and 75.  STIs (sexually transmitted infections)  Before age 24: Ask your care team if you should be screened for STIs.  After age 24: Get screened for STIs if you're at risk. You are at risk for STIs (including HIV) if:  You are sexually active with more than one person.  You don't use condoms every time.  You or a partner was diagnosed with a sexually transmitted infection.  If you are at risk for HIV, ask about PrEP medicine to prevent HIV.  Get tested for HIV at least once in your life, whether you are at risk for HIV or not.  Cancer screening tests  Cervical cancer screening: If you have a cervix, begin getting regular cervical cancer screening tests at age 21. Most people who have regular screenings with normal results can stop after age 65. Talk about this with your provider.  Breast cancer scan (mammogram): If you've ever had breasts, begin having regular mammograms starting at age 40. This is a scan to check for breast cancer.  Colon cancer screening: It is important to start screening for colon cancer at age 45.  Have a colonoscopy test every 10 years (or more often if you're at risk) Or, ask your provider about stool tests like a FIT test every year or Cologuard test every 3 years.  To learn more about your testing options, visit: www.[facility]/053035.pdf.  For help making a  decision, visit: angelika/tb51470.  Prostate cancer screening test: If you have a prostate and are age 55 to 69, ask your provider if you would benefit from a yearly prostate cancer screening test.  Lung cancer screening: If you are a current or former smoker age 50 to 80, ask your care team if ongoing lung cancer screenings are right for you.  For informational purposes only. Not to replace the advice of your health care provider. Copyright   2023 [facility]. All rights reserved. Clinically reviewed by the [facility]. [employer] 158365 - REV 04/24.

## 2024-07-01 ENCOUNTER — MYC MEDICAL ADVICE (OUTPATIENT)
Dept: DERMATOLOGY | Facility: CLINIC | Age: 49
End: 2024-07-01

## 2024-07-01 ENCOUNTER — OFFICE VISIT (OUTPATIENT)
Dept: UROLOGY | Facility: CLINIC | Age: 49
End: 2024-07-01
Attending: FAMILY MEDICINE
Payer: COMMERCIAL

## 2024-07-01 VITALS
WEIGHT: 240 LBS | OXYGEN SATURATION: 97 % | HEIGHT: 76 IN | HEART RATE: 61 BPM | DIASTOLIC BLOOD PRESSURE: 74 MMHG | SYSTOLIC BLOOD PRESSURE: 119 MMHG | TEMPERATURE: 98.2 F | BODY MASS INDEX: 29.22 KG/M2

## 2024-07-01 DIAGNOSIS — Z87.442 HISTORY OF RENAL STONE: ICD-10-CM

## 2024-07-01 DIAGNOSIS — R10.9 RIGHT FLANK PAIN: Primary | ICD-10-CM

## 2024-07-01 PROCEDURE — 99203 OFFICE O/P NEW LOW 30 MIN: CPT | Performed by: STUDENT IN AN ORGANIZED HEALTH CARE EDUCATION/TRAINING PROGRAM

## 2024-07-01 ASSESSMENT — PAIN SCALES - GENERAL: PAINLEVEL: MILD PAIN (3)

## 2024-07-01 NOTE — PATIENT INSTRUCTIONS
How Can I Prevent Kidney Stones?     Here is a good resource  https://kidneystones.[facility]/the-kidney-stone-diet/    Drink enough fluids each day.  Drink at least 2-3 liters of water or other low-sugar, low-calorie beverages daily, distributed throughout the day as much as possible    Reduce the amount of salt in your diet.  Restrict salt to 9493-3146 mg/day by reducing intake of processed foods, cheese, luncheon meats, salty snacks, and added salt  Your chance of developing kidney stones increases when you eat more sodium. Sodium is a part of salt. Sodium is in many canned, packaged, and fast foods. It is also in many condiments, seasonings, and meats.    Eat less meat.  Limit meat, poultry, and fish intake; limit of 6 ounces per day is a good starting point  Although you may need to limit how much animal protein you eat each day, you still need to make sure you get enough protein. Consider replacing some of the meat and animal protein you would typically eat with beans, dried peas, and lentils, which are plant-based foods that are high in protein and low in oxalate.    Eat foods with low oxalate levels.  If you normally eat a lot of high-oxalate foods (spinach, beets, chocolate, nuts, seeds, and potatoes), reduce portion sizes and eat them less often     Calcium  Even though calcium sounds like it would be the cause of calcium stones, it s not. In the right amounts, calcium can block other substances in the digestive tract that may cause stones  You want to get between 1,000 and 1,200 mg daily. This amount of calcium is ideal for both protection against bone mineral loss and reducing oxalate absorption.  Calcium is in dairy products like milk and cheese and yogurts and you should get few servings of these per day    More Fruits and Vegetables  Fruits and vegetables collectively provide many stone inhibitors that may prevent all types of urinary tract stones. These inhibitors include potassium, magnesium,  fiber, citric acid, phytate, and antioxidants. Eating at least 5 servings of a variety of fruits and vegetables every day may not only prevent you from forming more kidney stones, but also keep you healthy in other ways.

## 2024-07-01 NOTE — PROGRESS NOTES
"        Chief Complaint:   Kidney stones         History of Present Illness:   Dmitriy Saul is a 49 year old male presenting for an evaluation of kidney stones.     He reports intermittent right flank pain since March. He denies dysuria, gross hematuria, and urinary urgency.     He passed a kidney stone in December. He had lithotripsy in 2006. His maternal grandfather has kidney stones.     He has made calcium oxalate stones in the past.          Past Medical History:     Past Medical History:   Diagnosis Date    Calculus of kidney             Past Surgical History:     Past Surgical History:   Procedure Laterality Date    LITHOTRIPSY      2007?    pneumothorax surgery      in 1996    TONSILLECTOMY      age 7            Medications     Current Outpatient Medications   Medication Sig Dispense Refill    cetirizine (ZYRTEC) 10 MG tablet Take 10 mg by mouth daily as needed for allergies      olopatadine (PATANOL) 0.1 % ophthalmic solution 1 drop 2 times daily       Current Facility-Administered Medications   Medication Dose Route Frequency Provider Last Rate Last Admin    candida albicans skin test injection 0.3 mL  0.3 mL Intradermal Once                 Allergies:   Seasonal allergies         Review of Systems:  From intake questionnaire   Negative 14 system review except as noted on HPI, nurse's note.         Physical Exam:   Patient is a 49 year old  male   Vitals: Blood pressure 119/74, pulse 61, temperature 98.2  F (36.8  C), temperature source Tympanic, height 1.94 m (6' 4.38\"), weight 108.9 kg (240 lb), SpO2 97%.  General Appearance Adult: Alert, no acute distress, oriented.  Lungs: Non-labored breathing.  Heart: No obvious jugular venous distension present.  Neuro: Alert, oriented, speech and mentation normal      Labs and Pathology:    I personally reviewed all applicable laboratory data and went over findings with patient  Significant for:    CBC RESULTS:  Recent Labs   Lab Test 05/08/23  1002 " 18   WBC 8.9 15.4*   HGB 16.0 15.6    253        BMP RESULTS:  Recent Labs   Lab Test 23  1002 21  1058 18    140 140   POTASSIUM 4.4 4.0 3.2*   CHLORIDE 103 107 107   CO2 29 28 26   ANIONGAP 7 5 7   GLC 99 98 109*   BUN 13.0 14 13   CR 0.99 0.84 0.83   GFRESTIMATED >90 >90 >90   GFRESTBLACK  --   --  >90   JOHNIE 9.3 9.2 8.4*       UA RESULTS:   Recent Labs   Lab Test 18   SG 1.011   URINEPH 5.0   NITRITE Negative   RBCU <1   WBCU <1            Assessment and Plan:   Assessment: Dmitriy Saul is a 49 year old male seen in evaluation for right flank pain that has been present since March. He has a history of kidney stones, one that required lithotripsy in . He has been told he makes calcium oxalate stones.     We discussed obtaining a CT scan to better evaluate his right flank pain. The patient is agreeable.     We reviewed general recommendations to prevent kidney stones including the followin) Low oxalate diet. We discussed foods that are particularly high in oxalate including spinach, rhubarb, beets, nuts, nut butters, and black teas.     2) Low salt diet. Discussed common foods with high amounts of salt as well as dietary strategies to minimize sodium.     3) High fluid intake. Recommend 3 liters of fluid daily to produce goal of 2.5L of urine.     4) Modest animal protein. Recommend limiting animal protein to one serving or less daily.     5) Normal dietary calcium.       Plan:  CT abdomen pelvis without contrast to evaluate right flank pain.       Rowan Juarez PA-C  Department of Urology

## 2024-07-08 ENCOUNTER — ANCILLARY PROCEDURE (OUTPATIENT)
Dept: CT IMAGING | Facility: CLINIC | Age: 49
End: 2024-07-08
Attending: STUDENT IN AN ORGANIZED HEALTH CARE EDUCATION/TRAINING PROGRAM
Payer: COMMERCIAL

## 2024-07-08 DIAGNOSIS — R10.9 RIGHT FLANK PAIN: ICD-10-CM

## 2024-07-08 PROCEDURE — 74176 CT ABD & PELVIS W/O CONTRAST: CPT | Mod: TC | Performed by: RADIOLOGY

## 2024-07-09 DIAGNOSIS — N28.1 ACQUIRED CYST OF KIDNEY: Primary | ICD-10-CM

## 2024-07-10 ENCOUNTER — OFFICE VISIT (OUTPATIENT)
Dept: DERMATOLOGY | Facility: CLINIC | Age: 49
End: 2024-07-10
Payer: COMMERCIAL

## 2024-07-10 DIAGNOSIS — L82.0 INFLAMED SEBORRHEIC KERATOSIS: ICD-10-CM

## 2024-07-10 DIAGNOSIS — L82.1 VERRUCOUS KERATOSIS: ICD-10-CM

## 2024-07-10 DIAGNOSIS — B07.0 PLANTAR WART: Primary | ICD-10-CM

## 2024-07-10 PROCEDURE — 11900 INJECT SKIN LESIONS </W 7: CPT | Mod: XS | Performed by: DERMATOLOGY

## 2024-07-10 PROCEDURE — 17110 DESTRUCTION B9 LES UP TO 14: CPT | Performed by: DERMATOLOGY

## 2024-07-10 ASSESSMENT — PAIN SCALES - GENERAL: PAINLEVEL: NO PAIN (0)

## 2024-07-10 NOTE — NURSING NOTE
Dermatology Rooming Note    Dmitriy Saul's goals for this visit include:   Chief Complaint   Patient presents with    Derm Problem     Candida injection for warts and recheck spot on nose.      Nimisha Byrnes RN

## 2024-07-10 NOTE — LETTER
7/10/2024       RE: Dmitriy Saul  [address] [facility] [address]     Dear Colleague,    Thank you for referring your patient, Dmitriy Saul, to the [facility] DERMATOLOGY [facility]IC [place] [facility]. Please see a copy of my visit note below.    [facility] Dermatology Note  Encounter Date: Jul 10, 2024  Office Visit     Dermatology Problem List:  1. Fibrous papule, R nasal ala, s/p shave bx 4/26/24  2. Verrucous keratosis, L nasal tip  - LN2 4/26/24, 5/29/24, 7/10/24  3. Warts, L plantar foot  - LN2 4/26/24, 5/29/24, 7/10/24  - Candida 5/29/24, 7/10/24  - Home sal acid  ____________________________________________     Assessment & Plan:     # Inflamed verrucous keratosis, L nasal tip. Performed cryo with imprvement but then recurrence. Repeat cryo with significant improvement since last visit. Discussed option to repeat cryo today v bx will plan for cryo today one final time and will consider a biopsy next visit if it doesn't resolve.  - Cryotherapy, see procedure note  -  plan bx next visit if not resolved      # Verruca plantaris, L plantar foot x 2 - improved  - Cryotherapy performed, see procedure note  - Inject candida today, see procedure note. Today is #2.  - continue salicylic acid at home, counseled on use    Procedures Performed:   - Cryotherapy procedure note, location(s): see above. After verbal consent and discussion of risks and benefits including, but not limited to, dyspigmentation/scar, blister, and pain, 3 lesion(s) was(were) treated with 1-2 mm freeze border for 1-2 cycles with liquid nitrogen. Post cryotherapy instructions were provided.    - Intra-lesional candida injection procedure note. Discussion had with patient regarding candida antigen injection as potential treatment option and verbal consent obtained. After positioning and cleansing with isopropyl alcohol, 0.3 total mL of candida albicans  "antigen was injected into 1 lesion(s) on the L plantar foot. The patient tolerated the procedure well and left the dermatology clinic in good condition.    Follow-up: Appointment scheduled for 8/16/24    Staff and Scribe:     Scribe Disclosure:   I, DONOVAN GOODSONBLAKEEBER, am serving as a scribe; to document services personally performed by Shama Zimmerman MD-based on data collection and the provider's statements to me.      Provider Disclosure:   The documentation recorded by the scribe accurately reflects the services I personally performed and the decisions made by me.    Shama Zimmerman MD    Department of Dermatology  [facility]: Phone: [phone], Fax: [phone]  7/16/2024     ____________________________________________    CC: Derm Problem (Candida injection for warts and recheck spot on nose. )    HPI:  Mr. Dmitriy Saul is a(n) 49 year old male who presents today as a return patient for wart recheck. Last seen in derm by me on 5/29/24 for verrucous keratosis, which was treated with cryotherapy today.    Today, the patient mentions the spot is still present.  It is smaller.    He also points out one wart on his foot has gotten much better. It felt as if there was a \"marble\" under the skin, but it has since dissipated.    Patient is otherwise feeling well, without additional skin concerns.    Labs Reviewed:  N/A    Physical Exam:  Vitals: There were no vitals taken for this visit.  SKIN: Focused examination of below areas was performed.  - hyperkeratotic filiform papule on the L nasal tip  - hyperkeratotic papule on the L plantar foot x2. One is significantly improved, possibly resolved.  - No other lesions of concern on areas examined.     Medications:  Current Outpatient Medications   Medication Sig Dispense Refill     cetirizine (ZYRTEC) 10 MG tablet Take 10 mg by mouth daily as needed for allergies       " olopatadine (PATANOL) 0.1 % ophthalmic solution 1 drop 2 times daily       Current Facility-Administered Medications   Medication Dose Route Frequency Provider Last Rate Last Admin     candida albicans skin test injection 0.3 mL  0.3 mL Intradermal Once           Past Medical History:   Patient Active Problem List   Diagnosis     History of dysplastic nevus     Hyperlipidemia     Calculus of kidney     History of renal stone     Past Medical History:   Diagnosis Date     Calculus of kidney         CC No referring provider defined for this encounter. on close of this encounter.      Again, thank you for allowing me to participate in the care of your patient.      Sincerely,    Shama Zimmerman MD

## 2024-07-10 NOTE — PROGRESS NOTES
[facility] Dermatology Note  Encounter Date: Jul 10, 2024  Office Visit     Dermatology Problem List:  1. Fibrous papule, R nasal ala, s/p shave bx 4/26/24  2. Verrucous keratosis, L nasal tip  - LN2 4/26/24, 5/29/24, 7/10/24  3. Warts, L plantar foot  - LN2 4/26/24, 5/29/24, 7/10/24  - Candida 5/29/24, 7/10/24  - Home sal acid  ____________________________________________     Assessment & Plan:     # Inflamed verrucous keratosis, L nasal tip. Performed cryo with imprvement but then recurrence. Repeat cryo with significant improvement since last visit. Discussed option to repeat cryo today v bx will plan for cryo today one final time and will consider a biopsy next visit if it doesn't resolve.  - Cryotherapy, see procedure note  -  plan bx next visit if not resolved      # Verruca plantaris, L plantar foot x 2 - improved  - Cryotherapy performed, see procedure note  - Inject candida today, see procedure note. Today is #2.  - continue salicylic acid at home, counseled on use    Procedures Performed:   - Cryotherapy procedure note, location(s): see above. After verbal consent and discussion of risks and benefits including, but not limited to, dyspigmentation/scar, blister, and pain, 3 lesion(s) was(were) treated with 1-2 mm freeze border for 1-2 cycles with liquid nitrogen. Post cryotherapy instructions were provided.    - Intra-lesional candida injection procedure note. Discussion had with patient regarding candida antigen injection as potential treatment option and verbal consent obtained. After positioning and cleansing with isopropyl alcohol, 0.3 total mL of candida albicans antigen was injected into 1 lesion(s) on the L plantar foot. The patient tolerated the procedure well and left the dermatology clinic in good condition.    Follow-up: Appointment scheduled for 8/16/24    Staff and Scribe:     Scribe Disclosure:   DONOVAN VELÁZQUEZ, am serving as a scribe; to document services personally  "performed by Shama Zimmerman MD-based on data collection and the provider's statements to me.      Provider Disclosure:   The documentation recorded by the scribe accurately reflects the services I personally performed and the decisions made by me.    Shama Zimmerman MD    Department of Dermatology  [facility]: Phone: [phone], Fax: [phone]  7/16/2024     ____________________________________________    CC: Derm Problem (Candida injection for warts and recheck spot on nose. )    HPI:  Mr. Dmitriy Saul is a(n) 49 year old male who presents today as a return patient for wart recheck. Last seen in derm by me on 5/29/24 for verrucous keratosis, which was treated with cryotherapy today.    Today, the patient mentions the spot is still present.  It is smaller.    He also points out one wart on his foot has gotten much better. It felt as if there was a \"marble\" under the skin, but it has since dissipated.    Patient is otherwise feeling well, without additional skin concerns.    Labs Reviewed:  N/A    Physical Exam:  Vitals: There were no vitals taken for this visit.  SKIN: Focused examination of below areas was performed.  - hyperkeratotic filiform papule on the L nasal tip  - hyperkeratotic papule on the L plantar foot x2. One is significantly improved, possibly resolved.  - No other lesions of concern on areas examined.     Medications:  Current Outpatient Medications   Medication Sig Dispense Refill    cetirizine (ZYRTEC) 10 MG tablet Take 10 mg by mouth daily as needed for allergies      olopatadine (PATANOL) 0.1 % ophthalmic solution 1 drop 2 times daily       Current Facility-Administered Medications   Medication Dose Route Frequency Provider Last Rate Last Admin    candida albicans skin test injection 0.3 mL  0.3 mL Intradermal Once           Past Medical History:   Patient Active Problem List   Diagnosis    " History of dysplastic nevus    Hyperlipidemia    Calculus of kidney    History of renal stone     Past Medical History:   Diagnosis Date    Calculus of kidney         CC No referring provider defined for this encounter. on close of this encounter.

## 2024-07-10 NOTE — NURSING NOTE
Drug Administration Record    Prior to injection, verified patient identity using patient's name and date of birth.  Due to injection administration, patient instructed to remain in clinic for 15 minutes  afterwards, and to report any adverse reaction to me immediately.    Drug Name: candida antigen  Dose: 0.3mL of candida antigen  Route administered: ID  NDC #: [id number]  Amount of waste(mL):0.7ml  Reason for waste: Single use vial    LOT #: [id number]  SITE: see provider note  : [employer]  EXPIRATION DATE: 5/17/25

## 2024-07-16 RX ORDER — CANDIDA ALBICANS 1000 [PNU]/ML
0.3 INJECTION, SOLUTION INTRADERMAL ONCE
Status: ACTIVE | OUTPATIENT
Start: 2024-07-16

## 2024-07-23 ENCOUNTER — TELEPHONE (OUTPATIENT)
Dept: FAMILY MEDICINE | Facility: CLINIC | Age: 49
End: 2024-07-23
Payer: COMMERCIAL

## 2024-07-23 NOTE — TELEPHONE ENCOUNTER
Patient Quality Outreach    Patient is due for the following:   Colon Cancer Screening    Next Steps:   Pt states he wanted colonoscopy with [facility]. Did he have it done yet?    Type of outreach:    Sent [employer] message.      Questions for provider review:    None           Britt Gillespie, CMA

## 2024-08-06 ENCOUNTER — VIRTUAL VISIT (OUTPATIENT)
Dept: UROLOGY | Facility: CLINIC | Age: 49
End: 2024-08-06
Payer: COMMERCIAL

## 2024-08-06 DIAGNOSIS — N20.0 NEPHROLITHIASIS: Primary | ICD-10-CM

## 2024-08-06 DIAGNOSIS — E27.9 ADRENAL NODULE (H): ICD-10-CM

## 2024-08-06 PROCEDURE — 99214 OFFICE O/P EST MOD 30 MIN: CPT | Mod: 95 | Performed by: STUDENT IN AN ORGANIZED HEALTH CARE EDUCATION/TRAINING PROGRAM

## 2024-08-06 RX ORDER — DEXAMETHASONE 1 MG
1 TABLET ORAL ONCE
Qty: 1 TABLET | Refills: 0 | Status: SHIPPED | OUTPATIENT
Start: 2024-08-06 | End: 2024-08-06

## 2024-08-06 NOTE — PROGRESS NOTES
UROLOGY OUTPATIENT VISIT      Chief Complaint:   Kidney stone follow-up      Synopsis   Dmitriy Saul is a very pleasant AGE: 49 year old year old person with a history of kidney stone disease.    He reports some intermittent right flank pain since March.  He passed a kidney stone in December.  He had lithotripsy in 2006.  Shockwave lithotripsy  He has a history of calcium oxalate stones  He does have a  license      Imaging  I personally reviewed the CT scan and by my interpretation it demonstrates 3 stones.  One may be about 9 to 10 mm close renal pelvis.  In 1 smaller lower calyx stone and another very small 2 to 3 mm stone.  There is no hydronephrosis.  There is no left renal stones.  There is mention of cysts on the CT report but I do not appreciate this.                                                                     IMPRESSION:   1.  No acute findings in the abdomen and pelvis.  2.  A few small nonobstructing right renal calculi measuring up to 8  mm.  3.  A left adrenal 2.5 x 2.3 cm benign adenoma.   4.  A few probable cysts at the upper pole of the right kidney not  well characterized by noncontrast CT. Consider follow-up ultrasound.      he following distinct labs were reviewed   Most Recent 3 CBC's:  Recent Labs   Lab Test 05/08/23  1002 11/27/18  1920   WBC 8.9 15.4*   HGB 16.0 15.6   MCV 85 87    253     Most Recent 3 BMP's:  Recent Labs   Lab Test 05/08/23  1002 11/29/21  1058 11/27/18  1920    140 140   POTASSIUM 4.4 4.0 3.2*   CHLORIDE 103 107 107   CO2 29 28 26   BUN 13.0 14 13   CR 0.99 0.84 0.83   ANIONGAP 7 5 7   JOHNIE 9.3 9.2 8.4*   GLC 99 98 109*       Medical Comorbidities      Past Medical History:   Diagnosis Date    Calculus of kidney                Medications     Current Outpatient Medications   Medication Sig Dispense Refill    cetirizine (ZYRTEC) 10 MG tablet Take 10 mg by mouth daily as needed for allergies      olopatadine (PATANOL) 0.1 % ophthalmic  solution 1 drop 2 times daily       Current Facility-Administered Medications   Medication Dose Route Frequency Provider Last Rate Last Admin    candida albicans skin test injection 0.3 mL  0.3 mL Intradermal Once         candida albicans skin test injection 0.3 mL  0.3 mL Intradermal Once                 Assessment/Plan   49-year-old male presenting with kidney stones    #1 nephrolithiasis  Nonobstructing right renal stones.  We went over different treatment options.  I think given the number of stones and the fact that he needs to be stone free for being a  our best option would be to do ureteroscopy with laser lithotripsy.  I went over the risks and benefits of this procedure and that sometimes it will be a staged procedure he wants to proceed with this sometime in October or November    #2 adrenal nodule  We will plan to check for functionality with labs.  I will send him instructions on this through [employer]    CC:  Michael Hoyos    Phone call  16 minutes

## 2024-08-07 DIAGNOSIS — N39.0 URINARY TRACT INFECTION: Primary | ICD-10-CM

## 2024-08-08 ENCOUNTER — TELEPHONE (OUTPATIENT)
Dept: UROLOGY | Facility: CLINIC | Age: 49
End: 2024-08-08
Payer: COMMERCIAL

## 2024-08-08 NOTE — TELEPHONE ENCOUNTER
Surgery packet mailed to pt. Will follow up in 5-7 business days to confirm pt has received surgery packet.             Jacqueline Burdett   Clinic Station [place]   [facility]  [phone]

## 2024-08-09 ENCOUNTER — LAB (OUTPATIENT)
Dept: LAB | Facility: CLINIC | Age: 49
End: 2024-08-09
Payer: COMMERCIAL

## 2024-08-09 DIAGNOSIS — E27.9 ADRENAL NODULE (H): ICD-10-CM

## 2024-08-09 DIAGNOSIS — N39.0 URINARY TRACT INFECTION: ICD-10-CM

## 2024-08-09 LAB — CORTIS SERPL-MCNC: 0.8 UG/DL

## 2024-08-09 PROCEDURE — 82088 ASSAY OF ALDOSTERONE: CPT

## 2024-08-09 PROCEDURE — 82533 TOTAL CORTISOL: CPT

## 2024-08-09 PROCEDURE — 87086 URINE CULTURE/COLONY COUNT: CPT

## 2024-08-09 PROCEDURE — 84244 ASSAY OF RENIN: CPT | Mod: 90

## 2024-08-09 PROCEDURE — 99000 SPECIMEN HANDLING OFFICE-LAB: CPT

## 2024-08-09 PROCEDURE — 83835 ASSAY OF METANEPHRINES: CPT | Mod: 90

## 2024-08-09 PROCEDURE — 36415 COLL VENOUS BLD VENIPUNCTURE: CPT

## 2024-08-10 LAB — BACTERIA UR CULT: NORMAL

## 2024-08-12 LAB
ANNOTATION COMMENT IMP: NORMAL
METANEPHS SERPL-SCNC: 0.13 NMOL/L
NORMETANEPHRINE SERPL-SCNC: 0.2 NMOL/L
RENIN PLAS-CCNC: 1.3 NG/ML/HR

## 2024-08-13 LAB — ALDOST SERPL-MCNC: 4.7 NG/DL (ref 0–31)

## 2024-08-14 LAB — ALDOST/RENIN PLAS-RTO: 3.6 {RATIO} (ref 0–25)

## 2024-08-14 NOTE — TELEPHONE ENCOUNTER
Called and spoke to pt. Pt confirmed they received the surgery packet.       Jacqueline Walhalla   Clinic Station    [facility]  [phone]

## 2024-08-16 ENCOUNTER — OFFICE VISIT (OUTPATIENT)
Dept: DERMATOLOGY | Facility: CLINIC | Age: 49
End: 2024-08-16
Payer: COMMERCIAL

## 2024-08-16 DIAGNOSIS — L98.9 BENIGN SKIN LESION OF FOREHEAD: ICD-10-CM

## 2024-08-16 DIAGNOSIS — L82.0 INFLAMED SEBORRHEIC KERATOSIS: ICD-10-CM

## 2024-08-16 DIAGNOSIS — L82.1 VERRUCOUS KERATOSIS: Primary | ICD-10-CM

## 2024-08-16 DIAGNOSIS — D49.2 NEOPLASM OF SKIN: ICD-10-CM

## 2024-08-16 DIAGNOSIS — B07.0 PLANTAR WART: ICD-10-CM

## 2024-08-16 PROCEDURE — 11102 TANGNTL BX SKIN SINGLE LES: CPT | Mod: 59 | Performed by: DERMATOLOGY

## 2024-08-16 PROCEDURE — 17110 DESTRUCTION B9 LES UP TO 14: CPT | Mod: GC | Performed by: DERMATOLOGY

## 2024-08-16 PROCEDURE — 11900 INJECT SKIN LESIONS </W 7: CPT | Mod: 59 | Performed by: DERMATOLOGY

## 2024-08-16 PROCEDURE — 88305 TISSUE EXAM BY PATHOLOGIST: CPT | Mod: TC | Performed by: DERMATOLOGY

## 2024-08-16 PROCEDURE — 88305 TISSUE EXAM BY PATHOLOGIST: CPT | Mod: 26 | Performed by: PATHOLOGY

## 2024-08-16 ASSESSMENT — PAIN SCALES - GENERAL: PAINLEVEL: NO PAIN (0)

## 2024-08-16 NOTE — NURSING NOTE
Drug Administration Record    Prior to injection, verified patient identity using patient's name and date of birth.  Due to injection administration, patient instructed to remain in clinic for 15 minutes  afterwards, and to report any adverse reaction to me immediately.    Drug Name: candida antigen  Dose: 0.3mL of candida antigen  Route administered: ID  NDC #: [id number]  Amount of waste(mL):0.7  Reason for waste: Multi dose vial    LOT #: [id number]  SITE: see provider note  : [employer]  EXPIRATION DATE: 6/28/25

## 2024-08-16 NOTE — NURSING NOTE
Lidocaine-epinephrine 1-1:366906 % injection   0.1mL once for one use, starting 8/16/2024 ending 8/16/2024,  2mL disp, R-0, injection  Injected by Nimisha Byrnes RN

## 2024-08-16 NOTE — NURSING NOTE
Dermatology Rooming Note    Dmitriy Saul's goals for this visit include:   Chief Complaint   Patient presents with    Derm Problem     Candida injections for warts.      Nimisha Byrnes RN

## 2024-08-16 NOTE — PROGRESS NOTES
[facility] Dermatology Note    Encounter Date: Aug 16, 2024    Dermatology Problem List:  1. Fibrous papule, R nasal ala, s/p shave bx 4/26/24  2. Verrucous keratosis, L nasal tip s/p shave bx 8/16/24  - LN2 4/26/24, 5/29/24, 7/10/24, 8/16/24  3. Warts, L plantar foot  - LN2 4/26/24, 5/29/24, 7/10/24, 8/16/24  - Candida 5/29/24, 7/10/24, 8/16/24  - Home sal acid  4. Benign skin lesion of forehead    ______________________________________    Impression/Plan:  Dmitriy was seen today for derm problem.    Diagnoses and all orders for this visit:    # Neoplasm of uncertain behavior, L nasal tip. Suspect verrucous keratosis. Previously tried cryo with improvement but then reoccurred. Given ongoing recurrence, performed shave bx today given symptomatic nature.  -  shave bx 8/16/24      # Verruca plantaris, L plantar foot x 2 - overall improved  - Cryotherapy performed, see procedure note  - Inject candida today, see procedure note. Today is #3.  - continue salicylic acid at home, counseled on use    # ISK, R nasal bridge.  - cryo    # Benign skin lesion of R forehead   Isolated flat, slightly erythematous new 0.5cm lesion on R forehead that appears benign. Will continue to monitor and follow-up in 2 months.     Procedures Performed:   Cryotherapy procedure note: After verbal consent and discussion of risks and benefits including but no limited to dyspigmentation/scar, blister, and pain, 3 lesions on the R nasal bridge bridge, L bottom foot were treated with 1-2mm freeze border for 2 cycles with liquid nitrogen. Post cryotherapy instructions were provided.     SHAVE BIOPSY PROCEDURE NOTE: After written informed consent was obtained, a time out was taken to identify the patient and the correct site for biopsy. The lesion on the L nasal tip was cleansed with a 70% isopropyl alcohol wipe, and then injected with lidocaine 1%. Once anesthesia was ensured, the visible surface of the lesion was biopsied using a  Paolo blade in standard technique. Hemostasis was obtained with pressure and aluminum chloride 20% solution. The specimen was placed in a labeled formalin container and sent to pathology for sectioning and analysis. The wound was dressed with white petrolatum and an adhesive bandage. The patient tolerated the procedure well. Post-procedure instructions and recommendations were provided both verbally and in writing.    Intra-lesional candida injection procedure note. Discussion had with patient regarding candida antigen injection as potential treatment option and verbal consent obtained. After positioning and cleansing with isopropyl alcohol, 0.3 total mL of candida albicans antigen was injected into 1 lesion(s) on the L big toe. The patient tolerated the procedure well and left the dermatology clinic in good condition.     Follow-up in 2 months.     Staff Involved:    Patient seen and discussed with Dr. Shama Lopez MD  [facility] resident  PGY-2   Staff Physician Comments:   I saw and evaluated the patient with the resident and I agree with the assessment and plan.  I was present for the entire minor procedure and examination.    Shama Zimmerman MD    Department of Dermatology  [facility]: Phone: [phone], Fax: [phone]  8/22/2024       CC:   Chief Complaint   Patient presents with    Derm Problem     Candida injections for warts.        History of Present Illness:  Mr. Dmitriy Saul is a 49 year old male who presents as a return patient. Notes improvement of plantar warts with candida injections. Still has one wart on L big toe.     Also has 3 lesions of concern on face. Ongoing L nasal tip lesion, which he states has recurred after freezing it last time. Also a new lesion on his R nose bridge, in addition to R forehead lesion that he says is itchy. Patient is an avid bicyclist and says he  does wear a forehead sweat band, so unsure if this lesion is related.     Physical exam:  Vitals: There were no vitals taken for this visit.  GEN: well developed, well-nourished, in no acute distress, in a pleasant mood.     SKIN: Sierra phototype 1  - Focused examination of the face and feet were performed.  Face: 0.5cm flat erythematous lesion on R forehead, no bleeding or increased vascularization.   R nose bridge: 1 slightly waxy type lesion consistent with ISK   L nasal tip: hyperkeratotic filiform papule on L nasal tip   L Foot: hyperkeratotic papule on L plantar foot x2.  No other lesions of concern on areas examined.         Past Medical History:   Past Medical History:   Diagnosis Date    Calculus of kidney      Past Surgical History:   Procedure Laterality Date    LITHOTRIPSY      2007?    pneumothorax surgery      in 1996    TONSILLECTOMY      age 7       Social History:   reports that he has never smoked. He has never used smokeless tobacco. He reports current alcohol use. He reports that he does not use drugs.    Family History:  Family History   Problem Relation Age of Onset    Breast Cancer Mother 69    Heart block Father     Pancreatic Cancer Maternal Grandmother 77    Prostate Cancer Maternal Grandfather     Skin Cancer Maternal Grandfather     Crohn's Disease Brother     Galactosemia Brother     Other - See Comments Brother        Medications:  Current Outpatient Medications   Medication Sig Dispense Refill    cetirizine (ZYRTEC) 10 MG tablet Take 10 mg by mouth daily as needed for allergies      olopatadine (PATANOL) 0.1 % ophthalmic solution 1 drop 2 times daily      dexAMETHasone (DECADRON) 1 MG tablet Take 1 tablet (1 mg) by mouth once for 1 dose Take a 11 pm night before your lab appointment 1 tablet 0     Allergies   Allergen Reactions    Seasonal Allergies

## 2024-08-16 NOTE — LETTER
8/16/2024       RE: Dmitriy Saul  [address] [facility] [address]     Dear Colleague,    Thank you for referring your patient, Dmitriy Saul, to the [facility] DERMATOLOG[facility]IN[facility] [place] [facility]. Please see a copy of my visit note below.    [facility] Dermatology Note    Encounter Date: Aug 16, 2024    Dermatology Problem List:  1. Fibrous papule, R nasal ala, s/p shave bx 4/26/24  2. Verrucous keratosis, L nasal tip s/p shave bx 8/16/24  - LN2 4/26/24, 5/29/24, 7/10/24, 8/16/24  3. Warts, L plantar foot  - LN2 4/26/24, 5/29/24, 7/10/24, 8/16/24  - Candida 5/29/24, 7/10/24, 8/16/24  - Home sal acid  4. Benign skin lesion of forehead    ______________________________________    Impression/Plan:  Dmitriy was seen today for derm problem.    Diagnoses and all orders for this visit:    # Neoplasm of uncertain behavior, L nasal tip. Suspect verrucous keratosis. Previously tried cryo with improvement but then reoccurred. Given ongoing recurrence, performed shave bx today given symptomatic nature.  -  shave bx 8/16/24      # Verruca plantaris, L plantar foot x 2 - overall improved  - Cryotherapy performed, see procedure note  - Inject candida today, see procedure note. Today is #3.  - continue salicylic acid at home, counseled on use    # ISK, R nasal bridge.  - cryo    # Benign skin lesion of R forehead   Isolated flat, slightly erythematous new 0.5cm lesion on R forehead that appears benign. Will continue to monitor and follow-up in 2 months.     Procedures Performed:   Cryotherapy procedure note: After verbal consent and discussion of risks and benefits including but no limited to dyspigmentation/scar, blister, and pain, 3 lesions on the R nasal bridge bridge, L bottom foot were treated with 1-2mm freeze border for 2 cycles with liquid nitrogen. Post cryotherapy instructions were provided.     SHAVE BIOPSY PROCEDURE NOTE:  After written informed consent was obtained, a time out was taken to identify the patient and the correct site for biopsy. The lesion on the L nasal tip was cleansed with a 70% isopropyl alcohol wipe, and then injected with lidocaine 1%. Once anesthesia was ensured, the visible surface of the lesion was biopsied using a Paolo blade in standard technique. Hemostasis was obtained with pressure and aluminum chloride 20% solution. The specimen was placed in a labeled formalin container and sent to pathology for sectioning and analysis. The wound was dressed with white petrolatum and an adhesive bandage. The patient tolerated the procedure well. Post-procedure instructions and recommendations were provided both verbally and in writing.    Intra-lesional candida injection procedure note. Discussion had with patient regarding candida antigen injection as potential treatment option and verbal consent obtained. After positioning and cleansing with isopropyl alcohol, 0.3 total mL of candida albicans antigen was injected into 1 lesion(s) on the L big toe. The patient tolerated the procedure well and left the dermatology clinic in good condition.     Follow-up in 2 months.     Staff Involved:    Patient seen and discussed with Dr. Shama Lopez MD  [facility] resident  PGY-2   Staff Physician Comments:   I saw and evaluated the patient with the resident and I agree with the assessment and plan.  I was present for the entire minor procedure and examination.    Shama Zimmerman MD    Department of Dermatology  [facility]: Phone: [phone], Fax: [phone]  8/22/2024       CC:   Chief Complaint   Patient presents with     Derm Problem     Candida injections for warts.        History of Present Illness:  Mr. Dmitriy Saul is a 49 year old male who presents as a return patient. Notes improvement of plantar warts with  candida injections. Still has one wart on L big toe.     Also has 3 lesions of concern on face. Ongoing L nasal tip lesion, which he states has recurred after freezing it last time. Also a new lesion on his R nose bridge, in addition to R forehead lesion that he says is itchy. Patient is an avid bicyclist and says he does wear a forehead sweat band, so unsure if this lesion is related.     Physical exam:  Vitals: There were no vitals taken for this visit.  GEN: well developed, well-nourished, in no acute distress, in a pleasant mood.     SKIN: Sierra phototype 1  - Focused examination of the face and feet were performed.  Face: 0.5cm flat erythematous lesion on R forehead, no bleeding or increased vascularization.   R nose bridge: 1 slightly waxy type lesion consistent with ISK   L nasal tip: hyperkeratotic filiform papule on L nasal tip   L Foot: hyperkeratotic papule on L plantar foot x2.  No other lesions of concern on areas examined.         Past Medical History:   Past Medical History:   Diagnosis Date     Calculus of kidney      Past Surgical History:   Procedure Laterality Date     LITHOTRIPSY      2007?     pneumothorax surgery      in 1996     TONSILLECTOMY      age 7       Social History:   reports that he has never smoked. He has never used smokeless tobacco. He reports current alcohol use. He reports that he does not use drugs.    Family History:  Family History   Problem Relation Age of Onset     Breast Cancer Mother 69     Heart block Father      Pancreatic Cancer Maternal Grandmother 77     Prostate Cancer Maternal Grandfather      Skin Cancer Maternal Grandfather      Crohn's Disease Brother      Galactosemia Brother      Other - See Comments Brother        Medications:  Current Outpatient Medications   Medication Sig Dispense Refill     cetirizine (ZYRTEC) 10 MG tablet Take 10 mg by mouth daily as needed for allergies       olopatadine (PATANOL) 0.1 % ophthalmic solution 1 drop 2 times daily        dexAMETHasone (DECADRON) 1 MG tablet Take 1 tablet (1 mg) by mouth once for 1 dose Take a 11 pm night before your lab appointment 1 tablet 0     Allergies   Allergen Reactions     Seasonal Allergies          Again, thank you for allowing me to participate in the care of your patient.      Sincerely,    Shama Zimmerman MD

## 2024-08-22 RX ORDER — CANDIDA ALBICANS 1000 [PNU]/ML
0.3 INJECTION, SOLUTION INTRADERMAL ONCE
Status: ACTIVE | OUTPATIENT
Start: 2024-08-22

## 2024-09-23 ENCOUNTER — OFFICE VISIT (OUTPATIENT)
Dept: FAMILY MEDICINE | Facility: CLINIC | Age: 49
End: 2024-09-23
Payer: COMMERCIAL

## 2024-09-23 VITALS
WEIGHT: 243 LBS | BODY MASS INDEX: 29.59 KG/M2 | RESPIRATION RATE: 18 BRPM | SYSTOLIC BLOOD PRESSURE: 108 MMHG | TEMPERATURE: 98 F | HEART RATE: 62 BPM | HEIGHT: 76 IN | OXYGEN SATURATION: 98 % | DIASTOLIC BLOOD PRESSURE: 76 MMHG

## 2024-09-23 DIAGNOSIS — N20.0 CALCULUS OF KIDNEY: ICD-10-CM

## 2024-09-23 DIAGNOSIS — N28.1 RENAL CYST: ICD-10-CM

## 2024-09-23 DIAGNOSIS — Z01.818 PREOP GENERAL PHYSICAL EXAM: Primary | ICD-10-CM

## 2024-09-23 PROCEDURE — 99214 OFFICE O/P EST MOD 30 MIN: CPT | Performed by: FAMILY MEDICINE

## 2024-09-23 ASSESSMENT — PAIN SCALES - GENERAL: PAINLEVEL: NO PAIN (0)

## 2024-09-23 NOTE — PATIENT INSTRUCTIONS

## 2024-09-23 NOTE — PROGRESS NOTES
Preoperative Evaluation  [facility]  [address] [address]  Phone: [phone]  Primary Provider: Michael Hoyos MD  Pre-op Performing Provider: Greg Suárez DO  Sep 23, 2024             9/23/2024   Surgical Information   What procedure is being done? Cystoscopy, ureterscopy Right    Facility or Hospital where procedure/surgery will be performed: [facility]   Who is doing the procedure / surgery? Dr. Troy Gorman   Date of surgery / procedure: October 2nd   Time of surgery / procedure: 1:00 pm   Where do you plan to recover after surgery? at home with family        Fax number for surgical facility: Note does not need to be faxed, will be available electronically in Epic.    Assessment & Plan     The proposed surgical procedure is considered INTERMEDIATE risk.    Preop general physical exam  Mets>4   No chest pain or shortness of breath at rest or with activity.   No chronic medications.     Calculus of kidney  Scheduled for above procedure.     Renal cyst  Noted on last CT, plans for follow up renal US in Jan of 2025.           - No identified additional risk factors other than previously addressed    Antiplatelet or Anticoagulation Medication Instructions   - Patient is on no antiplatelet or anticoagulation medications.    Additional Medication Instructions   - Herbal medications and vitamins: DO NOT TAKE 14 days prior to surgery.    Recommendation  Approval given to proceed with proposed procedure, without further diagnostic evaluation.    Jordin Izaguirre is a 49 year old, presenting for the following:  Pre-Op Exam          9/23/2024     2:42 PM   Additional Questions   Roomed by CECY Chaparro related to upcoming procedure:     Renal stones on imaging.     CT abd/pelvis 7/8/2024  IMPRESSION:   1.  No acute findings in the abdomen and pelvis.  2.  A few small nonobstructing right renal calculi measuring up to  8  mm.  3.  A left adrenal 2.5 x 2.3 cm benign adenoma.   4.  A few probable cysts at the upper pole of the right kidney not  well characterized by noncontrast CT. Consider follow-up ultrasound.    Occasional flank pain.         9/23/2024   Pre-Op Questionnaire   Have you ever had a heart attack or stroke? No   Have you ever had surgery on your heart or blood vessels, such as a stent placement, a coronary artery bypass, or surgery on an artery in your head, neck, heart, or legs? No   Do you have chest pain with activity? No   Do you have a history of heart failure? No   Do you currently have a cold, bronchitis or symptoms of other infection? No   Do you have a cough, shortness of breath, or wheezing? No   Do you or anyone in your family have previous history of blood clots? No   Do you or does anyone in your family have a serious bleeding problem such as prolonged bleeding following surgeries or cuts? No   Have you ever had problems with anemia or been told to take iron pills? No   Have you had any abnormal blood loss such as black, tarry or bloody stools? No   Have you ever had a blood transfusion? No   Are you willing to have a blood transfusion if it is medically needed before, during, or after your surgery? Yes   Have you or any of your relatives ever had problems with anesthesia? No   Do you have sleep apnea, excessive snoring or daytime drowsiness? No   Do you have any artifical heart valves or other implanted medical devices like a pacemaker, defibrillator, or continuous glucose monitor? No   Do you have artificial joints? No   Are you allergic to latex? No        Health Care Directive  Patient does not have a Health Care Directive or Living Will:     Preoperative Review of    reviewed - no record of controlled substances prescribed.        Patient Active Problem List    Diagnosis Date Noted    Facial lesion 08/16/2024     Priority: Medium    History of renal stone 06/11/2024     Priority: Medium     "History of dysplastic nevus 04/04/2022     Priority: Medium     Formatting of this note might be different from the original.  DN, left abdomen, moderate atypia, s/p shave 12/5/2011      Hyperlipidemia 10/12/2015     Priority: Medium    Calculus of kidney 09/19/2006     Priority: Medium     Formatting of this note might be different from the original.  2001, calium oxalate        Past Medical History:   Diagnosis Date    Calculus of kidney      Past Surgical History:   Procedure Laterality Date    LITHOTRIPSY      2007?    pneumothorax surgery      in 1996    TONSILLECTOMY      age 7     Current Outpatient Medications   Medication Sig Dispense Refill    cetirizine (ZYRTEC) 10 MG tablet Take 10 mg by mouth daily as needed for allergies      olopatadine (PATANOL) 0.1 % ophthalmic solution 1 drop 2 times daily      dexAMETHasone (DECADRON) 1 MG tablet Take 1 tablet (1 mg) by mouth once for 1 dose Take a 11 pm night before your lab appointment 1 tablet 0       Allergies   Allergen Reactions    Seasonal Allergies         Social History     Tobacco Use    Smoking status: Never    Smokeless tobacco: Never   Substance Use Topics    Alcohol use: Yes     Comment: occ       History   Drug Use No             Review of Systems  Constitutional, HEENT, cardiovascular, pulmonary, gi and gu systems are negative, except as otherwise noted.    Objective    /76   Pulse 62   Temp 98  F (36.7  C) (Tympanic)   Resp 18   Ht 1.94 m (6' 4.38\")   Wt 110.2 kg (243 lb)   SpO2 98%   BMI 29.29 kg/m     Estimated body mass index is 29.29 kg/m  as calculated from the following:    Height as of this encounter: 1.94 m (6' 4.38\").    Weight as of this encounter: 110.2 kg (243 lb).  Physical Exam  GENERAL: alert and no distress  EYES: Eyes grossly normal to inspection, PERRL and conjunctivae and sclerae normal  HENT: ear canals and TM's normal, nose and mouth without ulcers or lesions  NECK: no adenopathy, no asymmetry, masses, or " scars  RESP: lungs clear to auscultation - no rales, rhonchi or wheezes  CV: regular rate and rhythm, normal S1 S2, no S3 or S4, no murmur, click or rub, no peripheral edema  ABDOMEN: soft, nontender, no hepatosplenomegaly, no masses and bowel sounds normal  MS: no gross musculoskeletal defects noted, no edema  SKIN: no suspicious lesions or rashes  NEURO: Normal strength and tone, mentation intact and speech normal  PSYCH: mentation appears normal, affect normal/bright    Recent Labs   Lab Test 06/11/24  1138   A1C 5.6        Diagnostics  No labs were ordered during this visit.   No EKG required, no history of coronary heart disease, significant arrhythmia, peripheral arterial disease or other structural heart disease.    Revised Cardiac Risk Index (RCRI)  The patient has the following serious cardiovascular risks for perioperative complications:   - No serious cardiac risks = 0 points     RCRI Interpretation: 0 points: Class I (very low risk - 0.4% complication rate)         Signed Electronically by: Greg Suárez DO  A copy of this evaluation report is provided to the requesting physician.

## 2024-09-23 NOTE — H&P (VIEW-ONLY)
Preoperative Evaluation  [facility]  [address] [address]  Phone: [phone]  Primary Provider: Michael Hoyos MD  Pre-op Performing Provider: Greg Suárez DO  Sep 23, 2024             9/23/2024   Surgical Information   What procedure is being done? Cystoscopy, ureterscopy Right    Facility or Hospital where procedure/surgery will be performed: [facility]   Who is doing the procedure / surgery? Dr. Troy Gorman   Date of surgery / procedure: October 2nd   Time of surgery / procedure: 1:00 pm   Where do you plan to recover after surgery? at home with family        Fax number for surgical facility: Note does not need to be faxed, will be available electronically in Epic.    Assessment & Plan     The proposed surgical procedure is considered INTERMEDIATE risk.    Preop general physical exam  Mets>4   No chest pain or shortness of breath at rest or with activity.   No chronic medications.     Calculus of kidney  Scheduled for above procedure.     Renal cyst  Noted on last CT, plans for follow up renal US in Jan of 2025.           - No identified additional risk factors other than previously addressed    Antiplatelet or Anticoagulation Medication Instructions   - Patient is on no antiplatelet or anticoagulation medications.    Additional Medication Instructions   - Herbal medications and vitamins: DO NOT TAKE 14 days prior to surgery.    Recommendation  Approval given to proceed with proposed procedure, without further diagnostic evaluation.    Jordin Izaguirre is a 49 year old, presenting for the following:  Pre-Op Exam          9/23/2024     2:42 PM   Additional Questions   Roomed by CECY Chaparro related to upcoming procedure:     Renal stones on imaging.     CT abd/pelvis 7/8/2024  IMPRESSION:   1.  No acute findings in the abdomen and pelvis.  2.  A few small nonobstructing right renal calculi measuring up to  8  mm.  3.  A left adrenal 2.5 x 2.3 cm benign adenoma.   4.  A few probable cysts at the upper pole of the right kidney not  well characterized by noncontrast CT. Consider follow-up ultrasound.    Occasional flank pain.         9/23/2024   Pre-Op Questionnaire   Have you ever had a heart attack or stroke? No   Have you ever had surgery on your heart or blood vessels, such as a stent placement, a coronary artery bypass, or surgery on an artery in your head, neck, heart, or legs? No   Do you have chest pain with activity? No   Do you have a history of heart failure? No   Do you currently have a cold, bronchitis or symptoms of other infection? No   Do you have a cough, shortness of breath, or wheezing? No   Do you or anyone in your family have previous history of blood clots? No   Do you or does anyone in your family have a serious bleeding problem such as prolonged bleeding following surgeries or cuts? No   Have you ever had problems with anemia or been told to take iron pills? No   Have you had any abnormal blood loss such as black, tarry or bloody stools? No   Have you ever had a blood transfusion? No   Are you willing to have a blood transfusion if it is medically needed before, during, or after your surgery? Yes   Have you or any of your relatives ever had problems with anesthesia? No   Do you have sleep apnea, excessive snoring or daytime drowsiness? No   Do you have any artifical heart valves or other implanted medical devices like a pacemaker, defibrillator, or continuous glucose monitor? No   Do you have artificial joints? No   Are you allergic to latex? No        Health Care Directive  Patient does not have a Health Care Directive or Living Will:     Preoperative Review of    reviewed - no record of controlled substances prescribed.        Patient Active Problem List    Diagnosis Date Noted    Facial lesion 08/16/2024     Priority: Medium    History of renal stone 06/11/2024     Priority: Medium     "History of dysplastic nevus 04/04/2022     Priority: Medium     Formatting of this note might be different from the original.  DN, left abdomen, moderate atypia, s/p shave 12/5/2011      Hyperlipidemia 10/12/2015     Priority: Medium    Calculus of kidney 09/19/2006     Priority: Medium     Formatting of this note might be different from the original.  2001, calium oxalate        Past Medical History:   Diagnosis Date    Calculus of kidney      Past Surgical History:   Procedure Laterality Date    LITHOTRIPSY      2007?    pneumothorax surgery      in 1996    TONSILLECTOMY      age 7     Current Outpatient Medications   Medication Sig Dispense Refill    cetirizine (ZYRTEC) 10 MG tablet Take 10 mg by mouth daily as needed for allergies      olopatadine (PATANOL) 0.1 % ophthalmic solution 1 drop 2 times daily      dexAMETHasone (DECADRON) 1 MG tablet Take 1 tablet (1 mg) by mouth once for 1 dose Take a 11 pm night before your lab appointment 1 tablet 0       Allergies   Allergen Reactions    Seasonal Allergies         Social History     Tobacco Use    Smoking status: Never    Smokeless tobacco: Never   Substance Use Topics    Alcohol use: Yes     Comment: occ       History   Drug Use No             Review of Systems  Constitutional, HEENT, cardiovascular, pulmonary, gi and gu systems are negative, except as otherwise noted.    Objective    /76   Pulse 62   Temp 98  F (36.7  C) (Tympanic)   Resp 18   Ht 1.94 m (6' 4.38\")   Wt 110.2 kg (243 lb)   SpO2 98%   BMI 29.29 kg/m     Estimated body mass index is 29.29 kg/m  as calculated from the following:    Height as of this encounter: 1.94 m (6' 4.38\").    Weight as of this encounter: 110.2 kg (243 lb).  Physical Exam  GENERAL: alert and no distress  EYES: Eyes grossly normal to inspection, PERRL and conjunctivae and sclerae normal  HENT: ear canals and TM's normal, nose and mouth without ulcers or lesions  NECK: no adenopathy, no asymmetry, masses, or " scars  RESP: lungs clear to auscultation - no rales, rhonchi or wheezes  CV: regular rate and rhythm, normal S1 S2, no S3 or S4, no murmur, click or rub, no peripheral edema  ABDOMEN: soft, nontender, no hepatosplenomegaly, no masses and bowel sounds normal  MS: no gross musculoskeletal defects noted, no edema  SKIN: no suspicious lesions or rashes  NEURO: Normal strength and tone, mentation intact and speech normal  PSYCH: mentation appears normal, affect normal/bright    Recent Labs   Lab Test 06/11/24  1138   A1C 5.6        Diagnostics  No labs were ordered during this visit.   No EKG required, no history of coronary heart disease, significant arrhythmia, peripheral arterial disease or other structural heart disease.    Revised Cardiac Risk Index (RCRI)  The patient has the following serious cardiovascular risks for perioperative complications:   - No serious cardiac risks = 0 points     RCRI Interpretation: 0 points: Class I (very low risk - 0.4% complication rate)         Signed Electronically by: Greg Suárez DO  A copy of this evaluation report is provided to the requesting physician.

## 2024-09-23 NOTE — H&P (VIEW-ONLY)
Preoperative Evaluation  [facility]  [address] [address]  Phone: [phone]  Primary Provider: Michael Hoyos MD  Pre-op Performing Provider: Greg Suárez DO  Sep 23, 2024             9/23/2024   Surgical Information   What procedure is being done? Cystoscopy, ureterscopy Right    Facility or Hospital where procedure/surgery will be performed: [facility]   Who is doing the procedure / surgery? Dr. Troy Gorman   Date of surgery / procedure: October 2nd   Time of surgery / procedure: 1:00 pm   Where do you plan to recover after surgery? at home with family        Fax number for surgical facility: Note does not need to be faxed, will be available electronically in Epic.    Assessment & Plan     The proposed surgical procedure is considered INTERMEDIATE risk.    Preop general physical exam  Mets>4   No chest pain or shortness of breath at rest or with activity.   No chronic medications.     Calculus of kidney  Scheduled for above procedure.     Renal cyst  Noted on last CT, plans for follow up renal US in Jan of 2025.           - No identified additional risk factors other than previously addressed    Antiplatelet or Anticoagulation Medication Instructions   - Patient is on no antiplatelet or anticoagulation medications.    Additional Medication Instructions   - Herbal medications and vitamins: DO NOT TAKE 14 days prior to surgery.    Recommendation  Approval given to proceed with proposed procedure, without further diagnostic evaluation.    Jordin Izaguirre is a 49 year old, presenting for the following:  Pre-Op Exam          9/23/2024     2:42 PM   Additional Questions   Roomed by CECY Chaparro related to upcoming procedure:     Renal stones on imaging.     CT abd/pelvis 7/8/2024  IMPRESSION:   1.  No acute findings in the abdomen and pelvis.  2.  A few small nonobstructing right renal calculi measuring up to  8  mm.  3.  A left adrenal 2.5 x 2.3 cm benign adenoma.   4.  A few probable cysts at the upper pole of the right kidney not  well characterized by noncontrast CT. Consider follow-up ultrasound.    Occasional flank pain.         9/23/2024   Pre-Op Questionnaire   Have you ever had a heart attack or stroke? No   Have you ever had surgery on your heart or blood vessels, such as a stent placement, a coronary artery bypass, or surgery on an artery in your head, neck, heart, or legs? No   Do you have chest pain with activity? No   Do you have a history of heart failure? No   Do you currently have a cold, bronchitis or symptoms of other infection? No   Do you have a cough, shortness of breath, or wheezing? No   Do you or anyone in your family have previous history of blood clots? No   Do you or does anyone in your family have a serious bleeding problem such as prolonged bleeding following surgeries or cuts? No   Have you ever had problems with anemia or been told to take iron pills? No   Have you had any abnormal blood loss such as black, tarry or bloody stools? No   Have you ever had a blood transfusion? No   Are you willing to have a blood transfusion if it is medically needed before, during, or after your surgery? Yes   Have you or any of your relatives ever had problems with anesthesia? No   Do you have sleep apnea, excessive snoring or daytime drowsiness? No   Do you have any artifical heart valves or other implanted medical devices like a pacemaker, defibrillator, or continuous glucose monitor? No   Do you have artificial joints? No   Are you allergic to latex? No        Health Care Directive  Patient does not have a Health Care Directive or Living Will:     Preoperative Review of    reviewed - no record of controlled substances prescribed.        Patient Active Problem List    Diagnosis Date Noted    Facial lesion 08/16/2024     Priority: Medium    History of renal stone 06/11/2024     Priority: Medium     "History of dysplastic nevus 04/04/2022     Priority: Medium     Formatting of this note might be different from the original.  DN, left abdomen, moderate atypia, s/p shave 12/5/2011      Hyperlipidemia 10/12/2015     Priority: Medium    Calculus of kidney 09/19/2006     Priority: Medium     Formatting of this note might be different from the original.  2001, calium oxalate        Past Medical History:   Diagnosis Date    Calculus of kidney      Past Surgical History:   Procedure Laterality Date    LITHOTRIPSY      2007?    pneumothorax surgery      in 1996    TONSILLECTOMY      age 7     Current Outpatient Medications   Medication Sig Dispense Refill    cetirizine (ZYRTEC) 10 MG tablet Take 10 mg by mouth daily as needed for allergies      olopatadine (PATANOL) 0.1 % ophthalmic solution 1 drop 2 times daily      dexAMETHasone (DECADRON) 1 MG tablet Take 1 tablet (1 mg) by mouth once for 1 dose Take a 11 pm night before your lab appointment 1 tablet 0       Allergies   Allergen Reactions    Seasonal Allergies         Social History     Tobacco Use    Smoking status: Never    Smokeless tobacco: Never   Substance Use Topics    Alcohol use: Yes     Comment: occ       History   Drug Use No             Review of Systems  Constitutional, HEENT, cardiovascular, pulmonary, gi and gu systems are negative, except as otherwise noted.    Objective    /76   Pulse 62   Temp 98  F (36.7  C) (Tympanic)   Resp 18   Ht 1.94 m (6' 4.38\")   Wt 110.2 kg (243 lb)   SpO2 98%   BMI 29.29 kg/m     Estimated body mass index is 29.29 kg/m  as calculated from the following:    Height as of this encounter: 1.94 m (6' 4.38\").    Weight as of this encounter: 110.2 kg (243 lb).  Physical Exam  GENERAL: alert and no distress  EYES: Eyes grossly normal to inspection, PERRL and conjunctivae and sclerae normal  HENT: ear canals and TM's normal, nose and mouth without ulcers or lesions  NECK: no adenopathy, no asymmetry, masses, or " scars  RESP: lungs clear to auscultation - no rales, rhonchi or wheezes  CV: regular rate and rhythm, normal S1 S2, no S3 or S4, no murmur, click or rub, no peripheral edema  ABDOMEN: soft, nontender, no hepatosplenomegaly, no masses and bowel sounds normal  MS: no gross musculoskeletal defects noted, no edema  SKIN: no suspicious lesions or rashes  NEURO: Normal strength and tone, mentation intact and speech normal  PSYCH: mentation appears normal, affect normal/bright    Recent Labs   Lab Test 06/11/24  1138   A1C 5.6        Diagnostics  No labs were ordered during this visit.   No EKG required, no history of coronary heart disease, significant arrhythmia, peripheral arterial disease or other structural heart disease.    Revised Cardiac Risk Index (RCRI)  The patient has the following serious cardiovascular risks for perioperative complications:   - No serious cardiac risks = 0 points     RCRI Interpretation: 0 points: Class I (very low risk - 0.4% complication rate)         Signed Electronically by: Greg Suárez DO  A copy of this evaluation report is provided to the requesting physician.

## 2024-09-30 ENCOUNTER — ANESTHESIA EVENT (OUTPATIENT)
Dept: SURGERY | Facility: CLINIC | Age: 49
End: 2024-09-30
Payer: COMMERCIAL

## 2024-09-30 NOTE — ANESTHESIA PREPROCEDURE EVALUATION
Anesthesia Pre-Procedure Evaluation    Patient: Dmitriy Saul   MRN: [id number] : 1975        Procedure : Procedure(s):  Cystoscopy, Right Ureteroscopy, Right retrograde Pyelogram, Right Holmium Laser Lithotripsy, Possible Right Ureteral Stent Placement          Past Medical History:   Diagnosis Date     Calculus of kidney       Past Surgical History:   Procedure Laterality Date     LITHOTRIPSY      ?     pneumothorax surgery      in      TONSILLECTOMY      age 7      Allergies   Allergen Reactions     Seasonal Allergies       Social History     Tobacco Use     Smoking status: Never     Smokeless tobacco: Never   Substance Use Topics     Alcohol use: Yes     Comment: occ      Wt Readings from Last 1 Encounters:   24 110.2 kg (243 lb)        Anesthesia Evaluation   Pt has had prior anesthetic. Type: General.        ROS/MED HX  ENT/Pulmonary:  - neg pulmonary ROS     Neurologic:  - neg neurologic ROS     Cardiovascular:     (+) Dyslipidemia - -   -  - -                                      METS/Exercise Tolerance:     Hematologic:  - neg hematologic  ROS     Musculoskeletal:  - neg musculoskeletal ROS     GI/Hepatic:  - neg GI/hepatic ROS     Renal/Genitourinary:     (+)       Nephrolithiasis ,       Endo:       Psychiatric/Substance Use:  - neg psychiatric ROS     Infectious Disease:  - neg infectious disease ROS     Malignancy:       Other:            Physical Exam    Airway        Mallampati: II   TM distance: > 3 FB   Neck ROM: full   Mouth opening: > 3 cm    Respiratory Devices and Support         Dental    unable to assess        Cardiovascular   cardiovascular exam normal          Pulmonary               OUTSIDE LABS:  CBC:   Lab Results   Component Value Date    WBC 8.9 2023    WBC 15.4 (H) 2018    HGB 16.0 2023    HGB 15.6 2018    HCT 48.4 2023    HCT 46.9 2018     2023     2018     BMP:   Lab Results   Component Value Date  Dc instructions verbalized by pt and pt's mother, all questions answered at this time. No distress upon dc. Scripts faxed.      "    05/08/2023     11/29/2021    POTASSIUM 4.4 05/08/2023    POTASSIUM 4.0 11/29/2021    CHLORIDE 103 05/08/2023    CHLORIDE 107 11/29/2021    CO2 29 05/08/2023    CO2 28 11/29/2021    BUN 13.0 05/08/2023    BUN 14 11/29/2021    CR 0.99 05/08/2023    CR 0.84 11/29/2021    GLC 99 05/08/2023    GLC 98 11/29/2021     COAGS: No results found for: \"PTT\", \"INR\", \"FIBR\"  POC: No results found for: \"BGM\", \"HCG\", \"HCGS\"  HEPATIC:   Lab Results   Component Value Date    ALBUMIN 3.9 11/27/2018    PROTTOTAL 7.5 11/27/2018    ALT 60 11/27/2018    AST 29 11/27/2018    ALKPHOS 72 11/27/2018    BILITOTAL 0.5 11/27/2018     OTHER:   Lab Results   Component Value Date    A1C 5.6 06/11/2024    JOHNIE 9.3 05/08/2023    TSH 0.55 05/08/2023       Anesthesia Plan    ASA Status:  2       Anesthesia Type: General.     - Airway: ETT   Induction: Intravenous.           Consents    Anesthesia Plan(s) and associated risks, benefits, and realistic alternatives discussed. Questions answered and patient/representative(s) expressed understanding.     - Discussed: Risks, Benefits and Alternatives for BOTH SEDATION and the PROCEDURE were discussed     - Discussed with:  Patient            Postoperative Care            Comments:             Jesus Kruse, TRINA CRNA    I have reviewed the pertinent notes and labs in the chart from the past 30 days and (re)examined the patient.  Any updates or changes from those notes are reflected in this note.              # Overweight: Estimated body mass index is 29.29 kg/m  as calculated from the following:    Height as of 9/23/24: 1.94 m (6' 4.38\").    Weight as of 9/23/24: 110.2 kg (243 lb).      "

## 2024-10-01 ENCOUNTER — LAB (OUTPATIENT)
Dept: LAB | Facility: CLINIC | Age: 49
End: 2024-10-01
Payer: COMMERCIAL

## 2024-10-01 ENCOUNTER — TELEPHONE (OUTPATIENT)
Dept: SURGERY | Facility: CLINIC | Age: 49
End: 2024-10-01
Payer: COMMERCIAL

## 2024-10-01 DIAGNOSIS — N20.0 NEPHROLITHIASIS: ICD-10-CM

## 2024-10-01 DIAGNOSIS — N20.0 NEPHROLITHIASIS: Primary | ICD-10-CM

## 2024-10-01 LAB
ALBUMIN UR-MCNC: NEGATIVE MG/DL
APPEARANCE UR: CLEAR
BACTERIA #/AREA URNS HPF: ABNORMAL /HPF
BILIRUB UR QL STRIP: NEGATIVE
COLOR UR AUTO: YELLOW
GLUCOSE UR STRIP-MCNC: NEGATIVE MG/DL
HGB UR QL STRIP: ABNORMAL
KETONES UR STRIP-MCNC: NEGATIVE MG/DL
LEUKOCYTE ESTERASE UR QL STRIP: NEGATIVE
NITRATE UR QL: NEGATIVE
PH UR STRIP: 5.5 [PH] (ref 5–7)
RBC #/AREA URNS AUTO: ABNORMAL /HPF
SP GR UR STRIP: >=1.03 (ref 1–1.03)
SQUAMOUS #/AREA URNS AUTO: ABNORMAL /LPF
UROBILINOGEN UR STRIP-ACNC: 0.2 E.U./DL
WBC #/AREA URNS AUTO: ABNORMAL /HPF

## 2024-10-01 PROCEDURE — 81001 URINALYSIS AUTO W/SCOPE: CPT

## 2024-10-02 ENCOUNTER — TELEPHONE (OUTPATIENT)
Dept: SURGERY | Facility: CLINIC | Age: 49
End: 2024-10-02
Payer: COMMERCIAL

## 2024-10-02 ENCOUNTER — APPOINTMENT (OUTPATIENT)
Dept: GENERAL RADIOLOGY | Facility: CLINIC | Age: 49
End: 2024-10-02
Attending: STUDENT IN AN ORGANIZED HEALTH CARE EDUCATION/TRAINING PROGRAM
Payer: COMMERCIAL

## 2024-10-02 ENCOUNTER — ANESTHESIA (OUTPATIENT)
Dept: SURGERY | Facility: CLINIC | Age: 49
End: 2024-10-02
Payer: COMMERCIAL

## 2024-10-02 ENCOUNTER — HOSPITAL ENCOUNTER (OUTPATIENT)
Facility: CLINIC | Age: 49
Discharge: HOME | End: 2024-10-02
Attending: STUDENT IN AN ORGANIZED HEALTH CARE EDUCATION/TRAINING PROGRAM | Admitting: STUDENT IN AN ORGANIZED HEALTH CARE EDUCATION/TRAINING PROGRAM
Payer: COMMERCIAL

## 2024-10-02 VITALS
SYSTOLIC BLOOD PRESSURE: 121 MMHG | HEART RATE: 45 BPM | OXYGEN SATURATION: 98 % | RESPIRATION RATE: 16 BRPM | WEIGHT: 243 LBS | HEIGHT: 76 IN | DIASTOLIC BLOOD PRESSURE: 61 MMHG | TEMPERATURE: 97.3 F | BODY MASS INDEX: 29.59 KG/M2

## 2024-10-02 DIAGNOSIS — N20.0 CALCULUS OF KIDNEY: Primary | ICD-10-CM

## 2024-10-02 DIAGNOSIS — N20.0 NEPHROLITHIASIS: Primary | ICD-10-CM

## 2024-10-02 PROCEDURE — C1758 CATHETER, URETERAL: HCPCS | Performed by: STUDENT IN AN ORGANIZED HEALTH CARE EDUCATION/TRAINING PROGRAM

## 2024-10-02 PROCEDURE — 250N000009 HC RX 250: Performed by: NURSE ANESTHETIST, CERTIFIED REGISTERED

## 2024-10-02 PROCEDURE — 255N000002 HC RX 255 OP 636: Performed by: STUDENT IN AN ORGANIZED HEALTH CARE EDUCATION/TRAINING PROGRAM

## 2024-10-02 PROCEDURE — 52356 CYSTO/URETERO W/LITHOTRIPSY: CPT | Mod: RT | Performed by: STUDENT IN AN ORGANIZED HEALTH CARE EDUCATION/TRAINING PROGRAM

## 2024-10-02 PROCEDURE — 74420 UROGRAPHY RTRGR +-KUB: CPT | Mod: 26 | Performed by: STUDENT IN AN ORGANIZED HEALTH CARE EDUCATION/TRAINING PROGRAM

## 2024-10-02 PROCEDURE — C1889 IMPLANT/INSERT DEVICE, NOC: HCPCS | Performed by: STUDENT IN AN ORGANIZED HEALTH CARE EDUCATION/TRAINING PROGRAM

## 2024-10-02 PROCEDURE — 370N000017 HC ANESTHESIA TECHNICAL FEE, PER MIN: Performed by: STUDENT IN AN ORGANIZED HEALTH CARE EDUCATION/TRAINING PROGRAM

## 2024-10-02 PROCEDURE — 999N000179 XR SURGERY CARM FLUORO LESS THAN 5 MIN W STILLS: Mod: TC

## 2024-10-02 PROCEDURE — C1747 HC OR ENDOSCOPE, SGL USE,URINARY TRACT, IMAGING/ILLUMINATION DEVICE: HCPCS | Performed by: STUDENT IN AN ORGANIZED HEALTH CARE EDUCATION/TRAINING PROGRAM

## 2024-10-02 PROCEDURE — 272N000001 HC OR GENERAL SUPPLY STERILE: Performed by: STUDENT IN AN ORGANIZED HEALTH CARE EDUCATION/TRAINING PROGRAM

## 2024-10-02 PROCEDURE — C2617 STENT, NON-COR, TEM W/O DEL: HCPCS | Performed by: STUDENT IN AN ORGANIZED HEALTH CARE EDUCATION/TRAINING PROGRAM

## 2024-10-02 PROCEDURE — 272N000002 HC OR SUPPLY OTHER OPNP: Performed by: STUDENT IN AN ORGANIZED HEALTH CARE EDUCATION/TRAINING PROGRAM

## 2024-10-02 PROCEDURE — 250N000009 HC RX 250

## 2024-10-02 PROCEDURE — 258N000003 HC RX IP 258 OP 636

## 2024-10-02 PROCEDURE — 710N000012 HC RECOVERY PHASE 2, PER MINUTE: Performed by: STUDENT IN AN ORGANIZED HEALTH CARE EDUCATION/TRAINING PROGRAM

## 2024-10-02 PROCEDURE — C1769 GUIDE WIRE: HCPCS | Performed by: STUDENT IN AN ORGANIZED HEALTH CARE EDUCATION/TRAINING PROGRAM

## 2024-10-02 PROCEDURE — 710N000010 HC RECOVERY PHASE 1, LEVEL 2, PER MIN: Performed by: STUDENT IN AN ORGANIZED HEALTH CARE EDUCATION/TRAINING PROGRAM

## 2024-10-02 PROCEDURE — 250N000011 HC RX IP 250 OP 636: Performed by: STUDENT IN AN ORGANIZED HEALTH CARE EDUCATION/TRAINING PROGRAM

## 2024-10-02 PROCEDURE — 271N000001 HC OR GENERAL SUPPLY NON-STERILE: Performed by: STUDENT IN AN ORGANIZED HEALTH CARE EDUCATION/TRAINING PROGRAM

## 2024-10-02 PROCEDURE — 250N000011 HC RX IP 250 OP 636: Performed by: NURSE ANESTHETIST, CERTIFIED REGISTERED

## 2024-10-02 PROCEDURE — 999N000141 HC STATISTIC PRE-PROCEDURE NURSING ASSESSMENT: Performed by: STUDENT IN AN ORGANIZED HEALTH CARE EDUCATION/TRAINING PROGRAM

## 2024-10-02 PROCEDURE — 360N000084 HC SURGERY LEVEL 4 W/ FLUORO, PER MIN: Performed by: STUDENT IN AN ORGANIZED HEALTH CARE EDUCATION/TRAINING PROGRAM

## 2024-10-02 PROCEDURE — 250N000013 HC RX MED GY IP 250 OP 250 PS 637: Performed by: STUDENT IN AN ORGANIZED HEALTH CARE EDUCATION/TRAINING PROGRAM

## 2024-10-02 DEVICE — URETERAL STENT
Type: IMPLANTABLE DEVICE | Site: URETER | Status: NON-FUNCTIONAL
Brand: PERCUFLEX™ PLUS
Removed: 2024-10-14

## 2024-10-02 RX ORDER — CEFAZOLIN SODIUM/WATER 2 G/20 ML
2 SYRINGE (ML) INTRAVENOUS SEE ADMIN INSTRUCTIONS
Status: DISCONTINUED | OUTPATIENT
Start: 2024-10-02 | End: 2024-10-02 | Stop reason: HOSPADM

## 2024-10-02 RX ORDER — NALOXONE HYDROCHLORIDE 0.4 MG/ML
0.1 INJECTION, SOLUTION INTRAMUSCULAR; INTRAVENOUS; SUBCUTANEOUS
Status: DISCONTINUED | OUTPATIENT
Start: 2024-10-02 | End: 2024-10-02 | Stop reason: HOSPADM

## 2024-10-02 RX ORDER — OXYCODONE HYDROCHLORIDE 5 MG/1
5 TABLET ORAL
Status: DISCONTINUED | OUTPATIENT
Start: 2024-10-02 | End: 2024-10-02 | Stop reason: HOSPADM

## 2024-10-02 RX ORDER — SENNA AND DOCUSATE SODIUM 50; 8.6 MG/1; MG/1
1 TABLET, FILM COATED ORAL DAILY
Qty: 30 TABLET | Refills: 0 | Status: SHIPPED | OUTPATIENT
Start: 2024-10-02

## 2024-10-02 RX ORDER — FENTANYL CITRATE 50 UG/ML
INJECTION, SOLUTION INTRAMUSCULAR; INTRAVENOUS PRN
Status: DISCONTINUED | OUTPATIENT
Start: 2024-10-02 | End: 2024-10-02

## 2024-10-02 RX ORDER — SENNOSIDES 8.6 MG
650 CAPSULE ORAL EVERY 6 HOURS PRN
Qty: 30 TABLET | Refills: 0 | Status: SHIPPED | OUTPATIENT
Start: 2024-10-02

## 2024-10-02 RX ORDER — OXYBUTYNIN CHLORIDE 10 MG/1
10 TABLET, EXTENDED RELEASE ORAL DAILY PRN
Qty: 14 TABLET | Refills: 0 | Status: SHIPPED | OUTPATIENT
Start: 2024-10-02 | End: 2024-10-16

## 2024-10-02 RX ORDER — OXYCODONE HYDROCHLORIDE 5 MG/1
10 TABLET ORAL
Status: DISCONTINUED | OUTPATIENT
Start: 2024-10-02 | End: 2024-10-02 | Stop reason: HOSPADM

## 2024-10-02 RX ORDER — SODIUM CHLORIDE, SODIUM LACTATE, POTASSIUM CHLORIDE, CALCIUM CHLORIDE 600; 310; 30; 20 MG/100ML; MG/100ML; MG/100ML; MG/100ML
INJECTION, SOLUTION INTRAVENOUS CONTINUOUS
Status: DISCONTINUED | OUTPATIENT
Start: 2024-10-02 | End: 2024-10-02 | Stop reason: HOSPADM

## 2024-10-02 RX ORDER — PROPOFOL 10 MG/ML
INJECTION, EMULSION INTRAVENOUS PRN
Status: DISCONTINUED | OUTPATIENT
Start: 2024-10-02 | End: 2024-10-02

## 2024-10-02 RX ORDER — FENTANYL CITRATE 50 UG/ML
25 INJECTION, SOLUTION INTRAMUSCULAR; INTRAVENOUS
Status: DISCONTINUED | OUTPATIENT
Start: 2024-10-02 | End: 2024-10-02 | Stop reason: HOSPADM

## 2024-10-02 RX ORDER — METOPROLOL TARTRATE 1 MG/ML
1-2 INJECTION, SOLUTION INTRAVENOUS EVERY 5 MIN PRN
Status: DISCONTINUED | OUTPATIENT
Start: 2024-10-02 | End: 2024-10-02 | Stop reason: HOSPADM

## 2024-10-02 RX ORDER — DEXAMETHASONE SODIUM PHOSPHATE 4 MG/ML
4 INJECTION, SOLUTION INTRA-ARTICULAR; INTRALESIONAL; INTRAMUSCULAR; INTRAVENOUS; SOFT TISSUE
Status: DISCONTINUED | OUTPATIENT
Start: 2024-10-02 | End: 2024-10-02 | Stop reason: HOSPADM

## 2024-10-02 RX ORDER — HYDROMORPHONE HCL IN WATER/PF 6 MG/30 ML
0.2 PATIENT CONTROLLED ANALGESIA SYRINGE INTRAVENOUS EVERY 5 MIN PRN
Status: DISCONTINUED | OUTPATIENT
Start: 2024-10-02 | End: 2024-10-02 | Stop reason: HOSPADM

## 2024-10-02 RX ORDER — PHENAZOPYRIDINE HYDROCHLORIDE 200 MG/1
200 TABLET, FILM COATED ORAL 3 TIMES DAILY PRN
Qty: 12 TABLET | Refills: 0 | Status: SHIPPED | OUTPATIENT
Start: 2024-10-02 | End: 2024-10-06

## 2024-10-02 RX ORDER — KETOROLAC TROMETHAMINE 10 MG/1
10 TABLET, FILM COATED ORAL EVERY 6 HOURS PRN
Qty: 20 TABLET | Refills: 0 | Status: SHIPPED | OUTPATIENT
Start: 2024-10-02 | End: 2024-10-07

## 2024-10-02 RX ORDER — ACETAMINOPHEN 650 MG/1
650 SUPPOSITORY RECTAL ONCE
Status: COMPLETED | OUTPATIENT
Start: 2024-10-02 | End: 2024-10-02

## 2024-10-02 RX ORDER — CEFAZOLIN SODIUM/WATER 2 G/20 ML
2 SYRINGE (ML) INTRAVENOUS
Status: COMPLETED | OUTPATIENT
Start: 2024-10-02 | End: 2024-10-02

## 2024-10-02 RX ORDER — LIDOCAINE 40 MG/G
CREAM TOPICAL
Status: DISCONTINUED | OUTPATIENT
Start: 2024-10-02 | End: 2024-10-02 | Stop reason: HOSPADM

## 2024-10-02 RX ORDER — KETAMINE HYDROCHLORIDE 10 MG/ML
INJECTION INTRAMUSCULAR; INTRAVENOUS PRN
Status: DISCONTINUED | OUTPATIENT
Start: 2024-10-02 | End: 2024-10-02

## 2024-10-02 RX ORDER — ONDANSETRON 2 MG/ML
INJECTION INTRAMUSCULAR; INTRAVENOUS PRN
Status: DISCONTINUED | OUTPATIENT
Start: 2024-10-02 | End: 2024-10-02

## 2024-10-02 RX ORDER — PROPOFOL 10 MG/ML
INJECTION, EMULSION INTRAVENOUS CONTINUOUS PRN
Status: DISCONTINUED | OUTPATIENT
Start: 2024-10-02 | End: 2024-10-02

## 2024-10-02 RX ORDER — HYDROMORPHONE HCL IN WATER/PF 6 MG/30 ML
0.4 PATIENT CONTROLLED ANALGESIA SYRINGE INTRAVENOUS EVERY 5 MIN PRN
Status: DISCONTINUED | OUTPATIENT
Start: 2024-10-02 | End: 2024-10-02 | Stop reason: HOSPADM

## 2024-10-02 RX ORDER — TAMSULOSIN HYDROCHLORIDE 0.4 MG/1
0.4 CAPSULE ORAL DAILY
Qty: 30 CAPSULE | Refills: 0 | Status: SHIPPED | OUTPATIENT
Start: 2024-10-02 | End: 2024-11-01

## 2024-10-02 RX ORDER — ONDANSETRON 4 MG/1
4 TABLET, ORALLY DISINTEGRATING ORAL EVERY 30 MIN PRN
Status: DISCONTINUED | OUTPATIENT
Start: 2024-10-02 | End: 2024-10-02 | Stop reason: HOSPADM

## 2024-10-02 RX ORDER — ONDANSETRON 2 MG/ML
4 INJECTION INTRAMUSCULAR; INTRAVENOUS EVERY 30 MIN PRN
Status: DISCONTINUED | OUTPATIENT
Start: 2024-10-02 | End: 2024-10-02 | Stop reason: HOSPADM

## 2024-10-02 RX ORDER — DEXAMETHASONE SODIUM PHOSPHATE 4 MG/ML
INJECTION, SOLUTION INTRA-ARTICULAR; INTRALESIONAL; INTRAMUSCULAR; INTRAVENOUS; SOFT TISSUE PRN
Status: DISCONTINUED | OUTPATIENT
Start: 2024-10-02 | End: 2024-10-02

## 2024-10-02 RX ORDER — LIDOCAINE HYDROCHLORIDE 20 MG/ML
INJECTION, SOLUTION INFILTRATION; PERINEURAL PRN
Status: DISCONTINUED | OUTPATIENT
Start: 2024-10-02 | End: 2024-10-02

## 2024-10-02 RX ORDER — ACETAMINOPHEN 325 MG/1
975 TABLET ORAL ONCE
Status: COMPLETED | OUTPATIENT
Start: 2024-10-02 | End: 2024-10-02

## 2024-10-02 RX ORDER — IOPAMIDOL 612 MG/ML
INJECTION, SOLUTION INTRAVASCULAR PRN
Status: DISCONTINUED | OUTPATIENT
Start: 2024-10-02 | End: 2024-10-02 | Stop reason: HOSPADM

## 2024-10-02 RX ORDER — MEPERIDINE HYDROCHLORIDE 25 MG/ML
INJECTION INTRAMUSCULAR; INTRAVENOUS; SUBCUTANEOUS PRN
Status: DISCONTINUED | OUTPATIENT
Start: 2024-10-02 | End: 2024-10-02

## 2024-10-02 RX ORDER — SCOLOPAMINE TRANSDERMAL SYSTEM 1 MG/1
1 PATCH, EXTENDED RELEASE TRANSDERMAL
Status: DISCONTINUED | OUTPATIENT
Start: 2024-10-02 | End: 2024-10-02 | Stop reason: HOSPADM

## 2024-10-02 RX ADMIN — DEXAMETHASONE SODIUM PHOSPHATE 8 MG: 4 INJECTION, SOLUTION INTRA-ARTICULAR; INTRALESIONAL; INTRAMUSCULAR; INTRAVENOUS; SOFT TISSUE at 13:19

## 2024-10-02 RX ADMIN — MEPERIDINE HYDROCHLORIDE 25 MG: 25 INJECTION, SOLUTION INTRAMUSCULAR; INTRAVENOUS; SUBCUTANEOUS at 13:44

## 2024-10-02 RX ADMIN — ROCURONIUM BROMIDE 30 MG: 50 INJECTION, SOLUTION INTRAVENOUS at 13:19

## 2024-10-02 RX ADMIN — Medication 2 G: at 13:05

## 2024-10-02 RX ADMIN — MIDAZOLAM 2 MG: 1 INJECTION INTRAMUSCULAR; INTRAVENOUS at 13:15

## 2024-10-02 RX ADMIN — SUGAMMADEX 200 MG: 100 INJECTION, SOLUTION INTRAVENOUS at 14:31

## 2024-10-02 RX ADMIN — KETAMINE HYDROCHLORIDE 30 MG: 10 INJECTION INTRAMUSCULAR; INTRAVENOUS at 13:18

## 2024-10-02 RX ADMIN — SODIUM CHLORIDE, POTASSIUM CHLORIDE, SODIUM LACTATE AND CALCIUM CHLORIDE: 600; 310; 30; 20 INJECTION, SOLUTION INTRAVENOUS at 13:57

## 2024-10-02 RX ADMIN — SCOPALAMINE 1 PATCH: 1 PATCH, EXTENDED RELEASE TRANSDERMAL at 12:23

## 2024-10-02 RX ADMIN — FENTANYL CITRATE 100 MCG: 50 INJECTION INTRAMUSCULAR; INTRAVENOUS at 13:17

## 2024-10-02 RX ADMIN — KETAMINE HYDROCHLORIDE 20 MG: 10 INJECTION INTRAMUSCULAR; INTRAVENOUS at 13:29

## 2024-10-02 RX ADMIN — ONDANSETRON 4 MG: 2 INJECTION INTRAMUSCULAR; INTRAVENOUS at 13:19

## 2024-10-02 RX ADMIN — ROCURONIUM BROMIDE 20 MG: 50 INJECTION, SOLUTION INTRAVENOUS at 13:44

## 2024-10-02 RX ADMIN — FENTANYL CITRATE 50 MCG: 50 INJECTION INTRAMUSCULAR; INTRAVENOUS at 13:55

## 2024-10-02 RX ADMIN — SODIUM CHLORIDE, POTASSIUM CHLORIDE, SODIUM LACTATE AND CALCIUM CHLORIDE: 600; 310; 30; 20 INJECTION, SOLUTION INTRAVENOUS at 12:08

## 2024-10-02 RX ADMIN — FENTANYL CITRATE 50 MCG: 50 INJECTION INTRAMUSCULAR; INTRAVENOUS at 14:02

## 2024-10-02 RX ADMIN — PROPOFOL 200 MG: 10 INJECTION, EMULSION INTRAVENOUS at 13:18

## 2024-10-02 RX ADMIN — PROPOFOL 200 MCG/KG/MIN: 10 INJECTION, EMULSION INTRAVENOUS at 13:18

## 2024-10-02 RX ADMIN — ACETAMINOPHEN 975 MG: 325 TABLET ORAL at 11:53

## 2024-10-02 RX ADMIN — LIDOCAINE HYDROCHLORIDE 100 MG: 20 INJECTION, SOLUTION INFILTRATION; PERINEURAL at 13:18

## 2024-10-02 ASSESSMENT — ACTIVITIES OF DAILY LIVING (ADL)
ADLS_ACUITY_SCORE: 20
ADLS_ACUITY_SCORE: 21
ADLS_ACUITY_SCORE: 20

## 2024-10-02 NOTE — OP NOTE
OPERATIVE REPORT    PREOPERATIVE DIAGNOSIS:  Nephrolithiasis [N20.0]     POSTOPERATIVE DIAGNOSIS:  Same    PROCEDURES PERFORMED:   Cystoscopy  Right Ureteroscopy   Right  Laser Lithotripsy  Right  Ureteral Stent placement   <1hr physician fluoroscopy time    STAFF SURGEON: Troy Gorman MD was present and participatory for the entire case.   RESIDENT(S): None  FELLOW: None     ANESTHESIA: General    ESTIMATED BLOOD LOSS: <5 ml    DRAINS/TUBES:   Right  6 [place] x 28cm double-J ureteral stent without String     IV FLUIDS: Please see dictated anesthesia record  COMPLICATIONS: None.   SPECIMEN:   None    SIGNIFICANT FINDINGS:        Large right renal pelvis stone moved to upper calyx and dusted, large lower pole stone still attached to papilla that due to angle I was unable to basket and remove, I dusted the laser to be smaller but still unable to move, due to the narrow ureter and having to scope without sheath I had limited mobility in lower calyx  .Will plan for secondary urs after passive dilation       BRIEF OPERATIVE INDICATIONS:  Dmitriy Saul is a(n) 49 year old with a history of kidney stone disease.  He has a recent CT scan that shows a 10 mm right mid calyx versus renal pelvis stone and then another 9 mm lower calyx stone.  He is interested in getting these treated.  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  He is also reporting intermittent right-sided flank pain After a discussion of all risks, benefits, and alternatives, the patient elected to proceed with definitive stone management with a ureteroscopic approach.    After induction of general anesthesia the patient was repositioned in dorsal lithotomy and prepped and draped in the standard sterile fashion.  A time out was performed confirming the appropriate patient identity and planned procedure.  The patient received culture directed antibiotics and had bilateral sequential compression devices for DVT  propylaxis.    A 22-[place]ian rigid cystoscope was inserted into a well-lubricated urethra. The urethra was unremarkable without stricture.     The bladder was examined in detail and no tumors were noted the ureteral orifice ease were orthotopic.    The ureteral orifice was identified and cannulated with a sensor wire with the aid of a dual lumen catheter. The wire passed without resistance into the upper pole    Retrograde Pyelogram  imaging fails to demonstrate radio-opaque renal stone consistent with pre-operative imaging. There is minimal hydronephrosis.     I attempted to go alongside the wire with the flexible ureteroscope but the ureter was too narrow.  I went over the wire with the ureteroscope and was able to go up to the kidney.  I identified the large cyst over I was able to gently move it to the upper calyx and I carefully dusted the stone at settings of 0.4 J and 20 Hz.  I intermittently aspirated the kidney to keep the pressure low and drained the bladder to make sure the bladder stent empty as we were not using an access sheath    I then examined the remainder of the kidney.  I identified a another small stone in the lower calyx that I dusted.  I then identified a larger stone in the lower calyx that was attached to the papillae.  I attempted to basket this several times but was not able to remove it off the papillae due to the angle of the stone and the calyx in the loop Lower portion of the kidney.  I then worked on lasering the stone to get it smaller but my mobility was limited given the lower calyx as well as the narrow ureter.  I was able to clear maybe 90% of the stone but not completely.    At this point I opted to proceed with a stent placement and go with secondary ureteroscopy as I felt one of the ureter is little more dilated hopefully will have more mobility to reach the stone and move it to an upper calyx and then further fragment or dust and remove the pieces.  I think is particularly  important for him given he is a  and we are trying to get stone free     Stent insertion  Right 6F 28 cm stent is inserted with good coil in kidney and bladder under fluoroscopic guidance.  No string was left behind       The bladder was drained    The patient tolerated the procedure well and there were no apparent complications. The patient  was transported to the postanesthesia care unit in stable condition.     POSTOP PLAN:  return to OR in around 2 weeks for definitive stone clearance

## 2024-10-02 NOTE — INTERVAL H&P NOTE
"I have reviewed the surgical (or preoperative) H&P that is linked to this encounter, and examined the patient. There are no significant changes  Plan for R URS  We identified and discussed risks of ureteroscopic stone clearance including but not limited to ureteral injury, infection,incomplete stone clearance, and possible necessity of unanticipated additional procedures. We also discussed that if ureter is narrow will need a stent first and secondary ureteroscopy. We discussed that the duration of the stent will be determined at end of case and while typically I usually leave in for 1 week if there is concern of injury or impacted stones or abrasions in ureter will need to leave it in longer. We discussed the side effects of stent which include urgency, frequency, hematuria, flank pain, dysuria.       Clinical Conditions Present on Arrival:  Clinically Significant Risk Factors Present on Admission                      # Overweight: Estimated body mass index is 29.29 kg/m  as calculated from the following:    Height as of this encounter: 1.94 m (6' 4.38\").    Weight as of this encounter: 110.2 kg (243 lb).       "

## 2024-10-02 NOTE — ANESTHESIA PROCEDURE NOTES
Airway         Procedure Start/Stop Times: 10/2/2024 1:20 PM  Staff -        CRNA: Malena Barrios APRN CRNA       Performed By: CRNAIndications and Patient Condition       Indications for airway management: airway protection       Induction type:intravenous       Mask difficulty assessment: 1 - vent by mask    Final Airway Details       Final airway type: endotracheal airway       Successful airway: ETT - single  Endotracheal Airway Details        ETT size (mm): 7.5       Cuffed: yes       Cuff volume (mL): 6       Successful intubation technique: video laryngoscopy       VL Blade Size: Lugo 3       Grade View of Cords: 1       Adjucts: stylet       Position: Right       Measured from: lips       Secured at (cm): 23       Bite block used: None    Post intubation assessment        Placement verified by: capnometry, equal breath sounds and chest rise        Number of attempts at approach: 1       Secured with: tape       Ease of procedure: easy       Dentition: Intact    Medication(s) Administered   Medication Administration Time: 10/2/2024 1:20 PM

## 2024-10-02 NOTE — ANESTHESIA CARE TRANSFER NOTE
Patient: Dmitriy Saul    Procedure: Procedure(s):  Cystoscopy, Right Ureteroscopy, Right retrograde Pyelogram, Right Holmium Laser Lithotripsy, Right Ureteral Stent Placement       Diagnosis: Nephrolithiasis [N20.0]  Diagnosis Additional Information: No value filed.    Anesthesia Type:   General     Note:    Oropharynx: oral airway in place  Level of Consciousness: drowsy      Independent Airway: airway patency satisfactory and stable  Dentition: dentition unchanged  Vital Signs Stable: post-procedure vital signs reviewed and stable  Report to RN Given: handoff report given  Patient transferred to: PACU    Handoff Report: Identifed the Patient, Identified the Reponsible Provider, Reviewed the pertinent medical history, Discussed the surgical course, Reviewed Intra-OP anesthesia mangement and issues during anesthesia, Set expectations for post-procedure period and Allowed opportunity for questions and acknowledgement of understanding      Vitals:  Vitals Value Taken Time   /77 10/02/24 1515   Temp 36.6  C (97.9  F) 10/02/24 1446   Pulse 45 10/02/24 1515   Resp 17 10/02/24 1515   SpO2 97 % 10/02/24 1515   Vitals shown include unfiled device data.    Electronically Signed By: TRINA Danielle CRNA  October 2, 2024  3:36 PM

## 2024-10-02 NOTE — BRIEF OP NOTE
[facility]    Brief Operative Note    Pre-operative diagnosis: Nephrolithiasis [N20.0]  Post-operative diagnosis Same as pre-operative diagnosis    Procedure: Cystoscopy, Right Ureteroscopy, Right retrograde Pyelogram, Right Holmium Laser Lithotripsy, Right Ureteral Stent Placement, Right - Urethra    Surgeon: Surgeons and Role:     * Troy Gorman MD - Primary  Anesthesia: Choice   Estimated Blood Loss: Minimal    Drains: 6x28 JJ stent no string on right  Specimens: * No specimens in log *  Findings:   Large right renal pelvis stone moved to upper calyx and dusted, large lower pole stone still attached to papilla that due to angle I was unable to basket and remove, I dusted the laser to be smaller but still unable to move, due to the narrow ureter and having to scope without sheath I had limited mobility in lower calyx  .Will plan for secondary urs after passive dilation  Complications: None.  Implants:   Implant Name Type Inv. Item Serial No.  Lot No. LRB No. Used Action   STENT URETERAL PERCUFLEX PLUS [id number] - [id number] Stent STENT URETERAL PERCUFLEX PLUS [id number]  [employer] [id number] Right 1 Implanted

## 2024-10-02 NOTE — ANESTHESIA POSTPROCEDURE EVALUATION
Patient: Dmitriy Saul    Procedure: Procedure(s):  Cystoscopy, Right Ureteroscopy, Right retrograde Pyelogram, Right Holmium Laser Lithotripsy, Right Ureteral Stent Placement       Anesthesia Type:  General    Note:  Disposition: Outpatient   Postop Pain Control: Uneventful            Sign Out: Well controlled pain   PONV: No   Neuro/Psych: Uneventful            Sign Out: Acceptable/Baseline neuro status   Airway/Respiratory: Uneventful            Sign Out: Acceptable/Baseline resp. status   CV/Hemodynamics: Uneventful            Sign Out: Acceptable CV status; No obvious hypovolemia; No obvious fluid overload   Other NRE:    DID A NON-ROUTINE EVENT OCCUR?            Last vitals:  Vitals Value Taken Time   /77 10/02/24 1515   Temp 36.6  C (97.9  F) 10/02/24 1446   Pulse 45 10/02/24 1515   Resp 17 10/02/24 1515   SpO2 97 % 10/02/24 1515   Vitals shown include unfiled device data.    Electronically Signed By: TRINA Danielle CRNA  October 2, 2024  3:36 PM

## 2024-10-02 NOTE — DISCHARGE INSTRUCTIONS
Here is a good resource I have made for information about kidneys stones, stents, and recovery after surgery and stone prevention. You can scan this QR code with the camera mele on your phone or you can visit the link below    [email or web address]          When is a stent needed?  A stent is placed if your urologist thinks the urine might not drain well after kidney stone surgery.  Stents are often placed to stop stone fragments or blood from blocking urine leaving the kidney and to  prevent spasms in the ureter. Stents can be left with or without a string.      What can I expect with a stent?  It is very common for stents to cause symptoms following surgery. You may experience some of the  following:   Urinary frequency and urgency   Burning or pain in your lower back during urination   Blood in the urine   Sensation of incomplete emptying of the bladder   Discomfort or pain in the bladder, lower abdomen and/or lower back    How to manage stent symptoms?  Drink plenty of fluids  Medications like Tamsulosin (e.g., Flomax) have been shown to reduce pain  Pain medication can be helpful in reducing discomfort or pain  Use a heating pad or take a warm bath for relief of pain    Will this affect daily activities?  Physical Activity: You may restart your normal physical routine. If you see increased blood in your urine when you become more active, get off your feet, rest, and drink plenty of fluids.  Work Activities, Social Life & Travel: Having a stent should not affect work activities, social life, or travel. If you experience urinary symptoms, you may need to use the bathroom more often.  Sex: Having a stent should not affect your sex life. However, if you have a stent with a string coming outside the body through the urethra, sexual activities may be difficult. Most patients have some of the symptoms, but they usually go away once the stent is removed.    How is the stent  removed?   Your stent is often removed within the first two weeks in the doctor s office.   If the stent was left with a string, you can remove it at home or have your  doctor s office remove it.   Before the stent is removed, drink plenty of water and take pain medication.    What can I expect after the stent is removed?  While most patients do not experience any symptoms after the stent is removed, some patients experience cramping due to bladder or ureteral spasms which may lead to feelings of nausea or urinary urgency. This is not unusual and will pass with time.  Continue to drink a lot of liquids and keep taking your pain medication as directed. Some doctors may prescribe medications to help alleviate these symptom    When to call your doctor?  Nausea, vomiting and unable to drink or keep down liquids  Chills, fever higher than 101  The stent falls out  Difficulty or inability to urinate  Constantly leaking urine  Severe pain that is not relieved by pain medication    Remember  These symptoms are common, and do not require medical help. They will pass with time: If you are still concerned, please contact your doctor s office.  Blood in urine  Pain or discomfort  Urinary frequency or urgency  Burning or pain during urination  Sensation of incomplete emptying of the bladder          Follow-Up:  - Follow up with Dr. Gorman in 2-3 weeks to clear out remainder of stone  - Call your primary care provider to touch base regarding your recent procedure.  - Call or return sooner than your regularly scheduled visit if you develop any of the following: fever (greater than 101.5), uncontrolled pain, uncontrolled nausea or vomiting, as well as increased redness, swelling, or drainage from your wound.     Phone numbers:   - Monday through Friday 8am to 4:30pm: Call [phone] with questions or to schedule or confirm appointment.                          Same Day Surgery Discharge Instructions  Special Precautions After  Surgery - Adult    It is not unusual to feel lightheaded or faint, up to 24 hours after surgery or while taking pain medication.  If you have these symptoms; sit for a few minutes before standing and have someone assist you when getting up.  You should rest and relax for the next 24 hours and must have someone stay with you for at least 24 hours after your discharge.  DO NOT DRIVE any vehicle or operate mechanical equipment for 24 hours following the end of your surgery.  DO NOT DRIVE while taking narcotic pain medications that have been prescribed by your physician.  If you had a limb operated on, you must be able to use it fully to drive.  DO NOT drink alcoholic beverages for 24 hours following surgery or while taking prescription pain medication.  Drink clear liquids (apple juice, ginger ale, broth, 7-Up, etc.).  Progress to your regular diet as you feel able.  Any questions call your physician and do not make important decisions for 24 hours.    Nausea and Vomiting: Nausea and vomiting can occur any time after receiving anesthesia. If you experience nausea and vomiting we encourage you to move to a clear liquid diet and advance your diet as tolerated. If nausea and vomiting do not improve within 12 hours please call the surgeon or present to the Emergency department.     Break-through Bleeding: If your experience bleeding from your surgical site apply pressure and additional dressing per nurse instruction. For simple problems such as a saturated dressing, you may need to reinforce the dressing with more gauze and tape and put slight pressure on the site. If bleeding does not subside contact the surgeon or present to the Emergency Department.    Post-op Infection: If you develop a fever of 100.4 or greater, have pus like drainage, redness, swelling or severe pain at the surgical site not alleviated with pain medications; please contact the surgeon or present to the Emergency Department.      Medications:  Acetaminophen (Tylenol):  Next dose: anytime after 6:00 PM.  Follow the instructions on the bottle.  __________________________________________________________________________________________________________________________________  IMPORTANT NUMBERS:    [facility] Main Number:  [phone], [phone]  Pharmacy:  [phone]  Same Day Surgery:  [phone], for general post-op questions call Monday - Thursday until 8:30 p.m., Fridays until 6:00 p.m.   Mental Health Mobile Crisis line: [phone]                                                                      [facility], podiatry:  [phone]  [facility]:  [phone]     [facility] Clinic:  [phone]   General Surgery:  [phone]  Urology: [phone]  Specialty Access Center: [phone]

## 2024-10-03 ENCOUNTER — TELEPHONE (OUTPATIENT)
Dept: UROLOGY | Facility: CLINIC | Age: 49
End: 2024-10-03
Payer: COMMERCIAL

## 2024-10-03 PROBLEM — N20.0 NEPHROLITHIASIS: Status: ACTIVE | Noted: 2024-10-02

## 2024-10-03 NOTE — TELEPHONE ENCOUNTER
[facility] Dr Philip will be doing second case     Spoke with: Patient       Date of surgery:  Monday October 14 2024 with Dr Philip       Location: [facility]      Informed patient they will need a adult : YES       Pre op with provider: - Pre op Done 9/23 at [facility]      H&P Scheduled in PAC- NA         Pre procedure covid : Not req      Additional imaging: NA        Surgery Packet : Sent via [employer]       Additional comments: Please call for surgery teaching

## 2024-10-07 ENCOUNTER — TELEPHONE (OUTPATIENT)
Dept: UROLOGY | Facility: CLINIC | Age: 49
End: 2024-10-07
Payer: COMMERCIAL

## 2024-10-07 NOTE — TELEPHONE ENCOUNTER
Patient had a preop at [facility] on 9/23, his last ua was negative on 10/1.  Second procedure for definitive stone clearance. Petra Kovacs RN

## 2024-10-09 ENCOUNTER — TELEPHONE (OUTPATIENT)
Dept: UROLOGY | Facility: CLINIC | Age: 49
End: 2024-10-09
Payer: COMMERCIAL

## 2024-10-09 ENCOUNTER — LAB (OUTPATIENT)
Dept: LAB | Facility: CLINIC | Age: 49
End: 2024-10-09
Payer: COMMERCIAL

## 2024-10-09 DIAGNOSIS — N39.0 URINARY TRACT INFECTION: ICD-10-CM

## 2024-10-09 DIAGNOSIS — N48.89 PENIS PAIN: ICD-10-CM

## 2024-10-09 DIAGNOSIS — Z79.2 PROPHYLACTIC ANTIBIOTIC: Primary | ICD-10-CM

## 2024-10-09 LAB
BACTERIA #/AREA URNS HPF: ABNORMAL /HPF
RBC #/AREA URNS AUTO: ABNORMAL /HPF
SQUAMOUS #/AREA URNS AUTO: ABNORMAL /LPF
WBC #/AREA URNS AUTO: ABNORMAL /HPF

## 2024-10-09 PROCEDURE — 87086 URINE CULTURE/COLONY COUNT: CPT

## 2024-10-09 PROCEDURE — 81015 MICROSCOPIC EXAM OF URINE: CPT

## 2024-10-09 RX ORDER — CEPHALEXIN 500 MG/1
500 CAPSULE ORAL 2 TIMES DAILY
Qty: 10 CAPSULE | Refills: 0 | Status: SHIPPED | OUTPATIENT
Start: 2024-10-09

## 2024-10-09 RX ORDER — DIMENHYDRINATE 50 MG
50 TABLET ORAL
Qty: 5 TABLET | Refills: 0 | Status: SHIPPED | OUTPATIENT
Start: 2024-10-09

## 2024-10-09 RX ORDER — PHENAZOPYRIDINE HYDROCHLORIDE 100 MG/1
200 TABLET, FILM COATED ORAL 3 TIMES DAILY PRN
Qty: 20 TABLET | Refills: 0 | Status: SHIPPED | OUTPATIENT
Start: 2024-10-09

## 2024-10-09 NOTE — TELEPHONE ENCOUNTER
Incoming call: Patients wife is a provider at [facility], she called because Dmitriy is in extreme pain at the tip of his meatus. He has no fever or chills. Tip of meatus is very sore, burning, and worse upon urinating. This is so bad the patient is having a hard time ambulating. Pt is a stoic jaime, so this is out of the norm for him to be showing such pain.     Client is post op day 7  Procedure on 10/2/2024: Cystoscopy, Right Ureteroscopy, Right retrograde Pyelogram, Right Holmium Laser Lithotripsy, Right Ureteral Stent Placement     Right  6 Slovenian x 28cm double-J ureteral stent without String     Postop plan:  return to OR in around 2 weeks for definitive stone clearance    Forwarding/Secure chat to provider to advise.    Provider Plan for todays encounter: Referred pain from bladder and stent. Nerve runs under bladder and often causes referred pain to tip of penis. Orders placed    Jacquelyn Benitez RN on 10/9/2024 at 8:20 AM

## 2024-10-09 NOTE — TELEPHONE ENCOUNTER
Pt's wife calling back and would like to talk to the nurse. She states she can be reached at [phone]  Explained to her that the RN (Jacquelyn) will call her back.   Jacqueline Carlson   [facility] [place]   [facility]  [phone]

## 2024-10-09 NOTE — PROGRESS NOTES
[facility] Dermatology Note    Encounter Date: Oct 11, 2024    Dermatology Problem List:  1. Fibrous papule, R nasal ala, s/p shave bx 4/26/24  2. Verrucous keratosis, L nasal tip s/p shave bx 8/16/24  - LN2 4/26/24, 5/29/24, 7/10/24, 8/16/24  3. Warts, L plantar foot  - LN2 4/26/24, 5/29/24, 7/10/24, 8/16/24  - Candida 5/29/24, 7/10/24, 8/16/24  - Home sal acid    ______________________________________    Impression/Plan:    # Verrucous keratosis, L nasal tip.   -  s/p shave bx 8/16/24  - Biopsy site well healed today       # Verruca plantaris, L plantar foot x 1 - overall improved, 1 has resolved   - Inject candida #4 today  - continue salicylic acid at home  - discussed efudex as possible treatment option     # Nevus, R forehead   - benign nature advised    Procedures Performed:   ELMA INJECTION PROCEDURE NOTE: After verbal consent obtained, 0.3 cc of Candida was injected into 1 lesions in above locations. Patient tolerated procedure well and left clinic in good condition.     Follow-up 05/07/2024 as scheduled     Staff and Scribe:     Scribe Disclosure:   I, Steff Shanks, am serving as a scribe; to document services personally performed by Shama Zimmerman MD -based on data collection and the provider's statements to me.   Provider Disclosure:   The documentation recorded by the scribe accurately reflects the services I personally performed and the decisions made by me.    Shama Zimmerman MD    Department of Dermatology  [facility]: Phone: [phone], Fax: [phone]  10/17/2024     ______________________________________    CC:   Chief Complaint   Patient presents with    Derm Problem     Wart recheck and candida injections on L foot.       History of Present Illness:  Mr. Dmitriy Saul is a 49 year old male who presents as a return patient. .     The patient reports that he he is doing  well. He reports that the wart on his toe is still there, and the other located on his foot is gone.     Physical exam:  Vitals: There were no vitals taken for this visit.  GEN: This is a well developed, well-nourished male in no acute distress, in a pleasant mood.    SKIN: Focused examination of the below  was performed.  - L nasal tip, bx site well healed today   - There is a verrucous papule with thrombosed capillaries interrupting dermatoglyphics on the L plantar foot .  - fleshy papule right forehead  - No other lesions of concern on areas examined.           Past Medical History:   Past Medical History:   Diagnosis Date    Calculus of kidney      Past Surgical History:   Procedure Laterality Date    COMBINED CYSTOSCOPY, RETROGRADES, URETEROSCOPY, LASER HOLMIUM LITHOTRIPSY URETER(S), INSERT STENT Right 10/2/2024    Procedure: Cystoscopy, Right Ureteroscopy, Right retrograde Pyelogram, Right Holmium Laser Lithotripsy, Right Ureteral Stent Placement;  Surgeon: Troy Gorman MD;  Location: [facility] OR    LITHOTRIPSY      2007?    pneumothorax surgery      in 1996    TONSILLECTOMY      age 7       Social History:   reports that he has never smoked. He has never used smokeless tobacco. He reports current alcohol use. He reports that he does not use drugs.    Family History:  Family History   Problem Relation Age of Onset    Breast Cancer Mother 69    Heart block Father     Pancreatic Cancer Maternal Grandmother 77    Prostate Cancer Maternal Grandfather     Skin Cancer Maternal Grandfather     Crohn's Disease Brother     Galactosemia Brother     Other - See Comments Brother     Anesthesia Reaction No family hx of     Malignant Hyperthermia No family hx of        Medications:  Current Outpatient Medications   Medication Sig Dispense Refill    acetaminophen (TYLENOL) 650 MG CR tablet Take 1 tablet (650 mg) by mouth every 6 hours as needed for mild pain or fever. 30 tablet 0    cephALEXin (KEFLEX) 500 MG capsule  Take 1 capsule (500 mg) by mouth 2 times daily. 10 capsule 0    cetirizine (ZYRTEC) 10 MG tablet Take 10 mg by mouth daily as needed for allergies      dimenhyDRINATE (DRAMAMINE) 50 MG tablet Take 1 tablet (50 mg) by mouth nightly as needed for sleep (Penile pain). 5 tablet 0    olopatadine (PATANOL) 0.1 % ophthalmic solution 1 drop 2 times daily      oxyBUTYnin ER (DITROPAN XL) 10 MG 24 hr tablet Take 1 tablet (10 mg) by mouth daily as needed for bladder spasms. Daily while stent in place 14 tablet 0    phenazopyridine (PYRIDIUM) 100 MG tablet Take 2 tablets (200 mg) by mouth 3 times daily as needed for urinary tract discomfort. 20 tablet 0    SENNA-docusate sodium (SENNA S) 8.6-50 MG tablet Take 1 tablet by mouth daily. For preventing constipation 30 tablet 0    tamsulosin (FLOMAX) 0.4 MG capsule Take 1 capsule (0.4 mg) by mouth daily. Daily while stent in position 30 capsule 0    dexAMETHasone (DECADRON) 1 MG tablet Take 1 tablet (1 mg) by mouth once for 1 dose Take a 11 pm night before your lab appointment 1 tablet 0     Allergies   Allergen Reactions    Seasonal Allergies

## 2024-10-09 NOTE — TELEPHONE ENCOUNTER
Spoke to patient on phone  Pain at tip of penis  Continuous  May have worsened in the last few days  Taking azo  No constipation  No fevers  No flank pain  I suspect stent discomfort  Will increase Azo  Will check UA/Ucx  Start empiric abx  Consider dramamine at PM    Troy Gorman MD

## 2024-10-10 LAB — BACTERIA UR CULT: NO GROWTH

## 2024-10-11 ENCOUNTER — ANESTHESIA EVENT (OUTPATIENT)
Dept: SURGERY | Facility: AMBULATORY SURGERY CENTER | Age: 49
End: 2024-10-11
Payer: COMMERCIAL

## 2024-10-11 ENCOUNTER — OFFICE VISIT (OUTPATIENT)
Dept: DERMATOLOGY | Facility: CLINIC | Age: 49
End: 2024-10-11
Payer: COMMERCIAL

## 2024-10-11 DIAGNOSIS — B07.0 PLANTAR WART: Primary | ICD-10-CM

## 2024-10-11 PROCEDURE — 99212 OFFICE O/P EST SF 10 MIN: CPT | Mod: 25 | Performed by: DERMATOLOGY

## 2024-10-11 PROCEDURE — 11900 INJECT SKIN LESIONS </W 7: CPT | Performed by: DERMATOLOGY

## 2024-10-11 RX ORDER — CANDIDA ALBICANS 1000 [PNU]/ML
0.1 INJECTION, SOLUTION INTRADERMAL ONCE
Status: DISCONTINUED | OUTPATIENT
Start: 2024-10-11 | End: 2024-10-18

## 2024-10-11 RX ADMIN — CANDIDA ALBICANS 0.3 ML: 1000 INJECTION, SOLUTION INTRADERMAL at 08:04

## 2024-10-11 ASSESSMENT — PAIN SCALES - GENERAL: PAINLEVEL: NO PAIN (0)

## 2024-10-11 NOTE — LETTER
10/11/2024       RE: Dmitriy Saul  [id number] [address]     Dear Colleague,    Thank you for referring your patient, Dmitriy Saul, to the [facility] [place] [facility]. Please see a copy of my visit note below.    [facility] Dermatology Note    Encounter Date: Oct 11, 2024    Dermatology Problem List:  1. Fibrous papule, R nasal ala, s/p shave bx 4/26/24  2. Verrucous keratosis, L nasal tip s/p shave bx 8/16/24  - LN2 4/26/24, 5/29/24, 7/10/24, 8/16/24  3. Warts, L plantar foot  - LN2 4/26/24, 5/29/24, 7/10/24, 8/16/24  - Candida 5/29/24, 7/10/24, 8/16/24  - Home sal acid    ______________________________________    Impression/Plan:    # Verrucous keratosis, L nasal tip.   -  s/p shave bx 8/16/24  - Biopsy site well healed today       # Verruca plantaris, L plantar foot x 1 - overall improved, 1 has resolved   - Inject candida #4 today  - continue salicylic acid at home  - discussed efudex as possible treatment option     # Nevus, R forehead   - benign nature advised    Procedures Performed:   ELMA INJECTION PROCEDURE NOTE: After verbal consent obtained, 0.3 cc of Candida was injected into 1 lesions in above locations. Patient tolerated procedure well and left clinic in good condition.     Follow-up 05/07/2024 as scheduled     Staff and Scribe:     Scribe Disclosure:   I, Steff Shanks, am serving as a scribe; to document services personally performed by Shama Zimmerman MD -based on data collection and the provider's statements to me.   Provider Disclosure:   The documentation recorded by the scribe accurately reflects the services I personally performed and the decisions made by me.    Shama Zimmerman MD    Department of Dermatology  [facility]: Phone: [phone], Fax:  [phone]  10/17/2024     ______________________________________    CC:   Chief Complaint   Patient presents with     Derm Problem     Wart recheck and candida injections on L foot.       History of Present Illness:  Mr. Dmitriy Saul is a 49 year old male who presents as a return patient. .     The patient reports that he he is doing well. He reports that the wart on his toe is still there, and the other located on his foot is gone.     Physical exam:  Vitals: There were no vitals taken for this visit.  GEN: This is a well developed, well-nourished male in no acute distress, in a pleasant mood.    SKIN: Focused examination of the below  was performed.  - L nasal tip, bx site well healed today   - There is a verrucous papule with thrombosed capillaries interrupting dermatoglyphics on the L plantar foot .  - fleshy papule right forehead  - No other lesions of concern on areas examined.           Past Medical History:   Past Medical History:   Diagnosis Date     Calculus of kidney      Past Surgical History:   Procedure Laterality Date     COMBINED CYSTOSCOPY, RETROGRADES, URETEROSCOPY, LASER HOLMIUM LITHOTRIPSY URETER(S), INSERT STENT Right 10/2/2024    Procedure: Cystoscopy, Right Ureteroscopy, Right retrograde Pyelogram, Right Holmium Laser Lithotripsy, Right Ureteral Stent Placement;  Surgeon: Troy Gorman MD;  Location: [place] OR     LITHOTRIPSY      2007?     pneumothorax surgery      in 1996     TONSILLECTOMY      age 7       Social History:   reports that he has never smoked. He has never used smokeless tobacco. He reports current alcohol use. He reports that he does not use drugs.    Family History:  Family History   Problem Relation Age of Onset     Breast Cancer Mother 69     Heart block Father      Pancreatic Cancer Maternal Grandmother 77     Prostate Cancer Maternal Grandfather      Skin Cancer Maternal Grandfather      Crohn's Disease Brother      Galactosemia Brother      Other - See  Comments Brother      Anesthesia Reaction No family hx of      Malignant Hyperthermia No family hx of        Medications:  Current Outpatient Medications   Medication Sig Dispense Refill     acetaminophen (TYLENOL) 650 MG CR tablet Take 1 tablet (650 mg) by mouth every 6 hours as needed for mild pain or fever. 30 tablet 0     cephALEXin (KEFLEX) 500 MG capsule Take 1 capsule (500 mg) by mouth 2 times daily. 10 capsule 0     cetirizine (ZYRTEC) 10 MG tablet Take 10 mg by mouth daily as needed for allergies       dimenhyDRINATE (DRAMAMINE) 50 MG tablet Take 1 tablet (50 mg) by mouth nightly as needed for sleep (Penile pain). 5 tablet 0     olopatadine (PATANOL) 0.1 % ophthalmic solution 1 drop 2 times daily       oxyBUTYnin ER (DITROPAN XL) 10 MG 24 hr tablet Take 1 tablet (10 mg) by mouth daily as needed for bladder spasms. Daily while stent in place 14 tablet 0     phenazopyridine (PYRIDIUM) 100 MG tablet Take 2 tablets (200 mg) by mouth 3 times daily as needed for urinary tract discomfort. 20 tablet 0     SENNA-docusate sodium (SENNA S) 8.6-50 MG tablet Take 1 tablet by mouth daily. For preventing constipation 30 tablet 0     tamsulosin (FLOMAX) 0.4 MG capsule Take 1 capsule (0.4 mg) by mouth daily. Daily while stent in position 30 capsule 0     dexAMETHasone (DECADRON) 1 MG tablet Take 1 tablet (1 mg) by mouth once for 1 dose Take a 11 pm night before your lab appointment 1 tablet 0     Allergies   Allergen Reactions     Seasonal Allergies      Again, thank you for allowing me to participate in the care of your patient.      Sincerely,    Shama Zimmerman MD

## 2024-10-11 NOTE — Clinical Note
Fyi 0.3 candida was injected, not sure how to fix when the order has been completed already, any ideas??

## 2024-10-11 NOTE — NURSING NOTE
Drug Administration Record    Prior to injection, verified patient identity using patient's name and date of birth.  Due to injection administration, patient instructed to remain in clinic for 15 minutes  afterwards, and to report any adverse reaction to me immediately.    Drug Name: candida antigen  Dose: 0.3mL of candida antigen  Route administered: ID  NDC #: [id number]  Amount of waste(mL):0.7ml  Reason for waste: Multi dose vial    LOT #: [id number]   SITE: See provider note  : [employer]  EXPIRATION DATE: 8/15/26

## 2024-10-11 NOTE — NURSING NOTE
Dermatology Rooming Note    Dmitriy Saul's goals for this visit include:   Chief Complaint   Patient presents with    Derm Problem     Wart recheck and candida injections on L foot.     Tiffany Randall, EMT

## 2024-10-13 RX ORDER — CEFAZOLIN SODIUM 2 G/100ML
2 INJECTION, SOLUTION INTRAVENOUS
Status: COMPLETED | OUTPATIENT
Start: 2024-10-13 | End: 2024-10-14

## 2024-10-14 ENCOUNTER — ANESTHESIA (OUTPATIENT)
Dept: SURGERY | Facility: AMBULATORY SURGERY CENTER | Age: 49
End: 2024-10-14
Payer: COMMERCIAL

## 2024-10-14 ENCOUNTER — HOSPITAL ENCOUNTER (OUTPATIENT)
Facility: AMBULATORY SURGERY CENTER | Age: 49
Discharge: HOME | End: 2024-10-14
Attending: UROLOGY
Payer: COMMERCIAL

## 2024-10-14 VITALS
TEMPERATURE: 97.2 F | DIASTOLIC BLOOD PRESSURE: 57 MMHG | OXYGEN SATURATION: 96 % | RESPIRATION RATE: 16 BRPM | BODY MASS INDEX: 29.22 KG/M2 | WEIGHT: 240 LBS | SYSTOLIC BLOOD PRESSURE: 113 MMHG | HEIGHT: 76 IN | HEART RATE: 54 BPM

## 2024-10-14 DIAGNOSIS — N20.0 NEPHROLITHIASIS: Primary | ICD-10-CM

## 2024-10-14 PROCEDURE — 74420 UROGRAPHY RTRGR +-KUB: CPT | Mod: 26 | Performed by: UROLOGY

## 2024-10-14 PROCEDURE — 52356 CYSTO/URETERO W/LITHOTRIPSY: CPT | Mod: RT | Performed by: UROLOGY

## 2024-10-14 RX ORDER — ONDANSETRON 4 MG/1
4 TABLET, ORALLY DISINTEGRATING ORAL EVERY 30 MIN PRN
Status: DISCONTINUED | OUTPATIENT
Start: 2024-10-14 | End: 2024-10-15 | Stop reason: HOSPADM

## 2024-10-14 RX ORDER — HYDROMORPHONE HCL IN WATER/PF 6 MG/30 ML
0.4 PATIENT CONTROLLED ANALGESIA SYRINGE INTRAVENOUS EVERY 5 MIN PRN
Status: DISCONTINUED | OUTPATIENT
Start: 2024-10-14 | End: 2024-10-15 | Stop reason: HOSPADM

## 2024-10-14 RX ORDER — OXYCODONE HYDROCHLORIDE 5 MG/1
5 TABLET ORAL
Status: DISCONTINUED | OUTPATIENT
Start: 2024-10-14 | End: 2024-10-15 | Stop reason: HOSPADM

## 2024-10-14 RX ORDER — FENTANYL CITRATE 0.05 MG/ML
50 INJECTION, SOLUTION INTRAMUSCULAR; INTRAVENOUS
Status: DISCONTINUED | OUTPATIENT
Start: 2024-10-14 | End: 2024-10-15 | Stop reason: HOSPADM

## 2024-10-14 RX ORDER — NALOXONE HYDROCHLORIDE 0.4 MG/ML
0.1 INJECTION, SOLUTION INTRAMUSCULAR; INTRAVENOUS; SUBCUTANEOUS
Status: DISCONTINUED | OUTPATIENT
Start: 2024-10-14 | End: 2024-10-15 | Stop reason: HOSPADM

## 2024-10-14 RX ORDER — ACETAMINOPHEN 325 MG/1
975 TABLET ORAL ONCE
Status: COMPLETED | OUTPATIENT
Start: 2024-10-14 | End: 2024-10-14

## 2024-10-14 RX ORDER — DEXAMETHASONE SODIUM PHOSPHATE 4 MG/ML
4 INJECTION, SOLUTION INTRA-ARTICULAR; INTRALESIONAL; INTRAMUSCULAR; INTRAVENOUS; SOFT TISSUE
Status: DISCONTINUED | OUTPATIENT
Start: 2024-10-14 | End: 2024-10-15 | Stop reason: HOSPADM

## 2024-10-14 RX ORDER — FENTANYL CITRATE 0.05 MG/ML
25 INJECTION, SOLUTION INTRAMUSCULAR; INTRAVENOUS EVERY 5 MIN PRN
Status: DISCONTINUED | OUTPATIENT
Start: 2024-10-14 | End: 2024-10-15 | Stop reason: HOSPADM

## 2024-10-14 RX ORDER — OXYCODONE HYDROCHLORIDE 5 MG/1
5 TABLET ORAL EVERY 6 HOURS PRN
Qty: 20 TABLET | Refills: 0 | Status: SHIPPED | OUTPATIENT
Start: 2024-10-14 | End: 2024-10-17

## 2024-10-14 RX ORDER — LIDOCAINE HYDROCHLORIDE 20 MG/ML
JELLY TOPICAL PRN
Status: DISCONTINUED | OUTPATIENT
Start: 2024-10-14 | End: 2024-10-14 | Stop reason: HOSPADM

## 2024-10-14 RX ORDER — HYDROMORPHONE HCL IN WATER/PF 6 MG/30 ML
0.2 PATIENT CONTROLLED ANALGESIA SYRINGE INTRAVENOUS EVERY 5 MIN PRN
Status: DISCONTINUED | OUTPATIENT
Start: 2024-10-14 | End: 2024-10-15 | Stop reason: HOSPADM

## 2024-10-14 RX ORDER — ONDANSETRON 2 MG/ML
4 INJECTION INTRAMUSCULAR; INTRAVENOUS EVERY 30 MIN PRN
Status: DISCONTINUED | OUTPATIENT
Start: 2024-10-14 | End: 2024-10-15 | Stop reason: HOSPADM

## 2024-10-14 RX ORDER — LIDOCAINE 40 MG/G
CREAM TOPICAL
Status: DISCONTINUED | OUTPATIENT
Start: 2024-10-14 | End: 2024-10-15 | Stop reason: HOSPADM

## 2024-10-14 RX ORDER — SCOLOPAMINE TRANSDERMAL SYSTEM 1 MG/1
1 PATCH, EXTENDED RELEASE TRANSDERMAL
Status: DISCONTINUED | OUTPATIENT
Start: 2024-10-14 | End: 2024-10-15 | Stop reason: HOSPADM

## 2024-10-14 RX ORDER — FENTANYL CITRATE 0.05 MG/ML
50 INJECTION, SOLUTION INTRAMUSCULAR; INTRAVENOUS EVERY 5 MIN PRN
Status: DISCONTINUED | OUTPATIENT
Start: 2024-10-14 | End: 2024-10-15 | Stop reason: HOSPADM

## 2024-10-14 RX ORDER — PROPOFOL 10 MG/ML
INJECTION, EMULSION INTRAVENOUS CONTINUOUS PRN
Status: DISCONTINUED | OUTPATIENT
Start: 2024-10-14 | End: 2024-10-14

## 2024-10-14 RX ORDER — LIDOCAINE HYDROCHLORIDE 20 MG/ML
INJECTION, SOLUTION INFILTRATION; PERINEURAL PRN
Status: DISCONTINUED | OUTPATIENT
Start: 2024-10-14 | End: 2024-10-14

## 2024-10-14 RX ORDER — FENTANYL CITRATE 50 UG/ML
INJECTION, SOLUTION INTRAMUSCULAR; INTRAVENOUS PRN
Status: DISCONTINUED | OUTPATIENT
Start: 2024-10-14 | End: 2024-10-14

## 2024-10-14 RX ORDER — LIDOCAINE HYDROCHLORIDE 10 MG/ML
INJECTION, SOLUTION EPIDURAL; INFILTRATION; INTRACAUDAL; PERINEURAL PRN
Status: DISCONTINUED | OUTPATIENT
Start: 2024-10-14 | End: 2024-10-14 | Stop reason: HOSPADM

## 2024-10-14 RX ORDER — KETOROLAC TROMETHAMINE 10 MG/1
10 TABLET, FILM COATED ORAL EVERY 6 HOURS PRN
Qty: 20 TABLET | Refills: 0 | Status: SHIPPED | OUTPATIENT
Start: 2024-10-14

## 2024-10-14 RX ORDER — PROPOFOL 10 MG/ML
INJECTION, EMULSION INTRAVENOUS PRN
Status: DISCONTINUED | OUTPATIENT
Start: 2024-10-14 | End: 2024-10-14

## 2024-10-14 RX ORDER — KETOROLAC TROMETHAMINE 30 MG/ML
INJECTION, SOLUTION INTRAMUSCULAR; INTRAVENOUS PRN
Status: DISCONTINUED | OUTPATIENT
Start: 2024-10-14 | End: 2024-10-14

## 2024-10-14 RX ORDER — SODIUM CHLORIDE, SODIUM LACTATE, POTASSIUM CHLORIDE, CALCIUM CHLORIDE 600; 310; 30; 20 MG/100ML; MG/100ML; MG/100ML; MG/100ML
INJECTION, SOLUTION INTRAVENOUS CONTINUOUS
Status: DISCONTINUED | OUTPATIENT
Start: 2024-10-14 | End: 2024-10-15 | Stop reason: HOSPADM

## 2024-10-14 RX ORDER — ONDANSETRON 2 MG/ML
INJECTION INTRAMUSCULAR; INTRAVENOUS PRN
Status: DISCONTINUED | OUTPATIENT
Start: 2024-10-14 | End: 2024-10-14

## 2024-10-14 RX ORDER — DEXAMETHASONE SODIUM PHOSPHATE 4 MG/ML
INJECTION, SOLUTION INTRA-ARTICULAR; INTRALESIONAL; INTRAMUSCULAR; INTRAVENOUS; SOFT TISSUE PRN
Status: DISCONTINUED | OUTPATIENT
Start: 2024-10-14 | End: 2024-10-14

## 2024-10-14 RX ADMIN — PROPOFOL 200 MCG/KG/MIN: 10 INJECTION, EMULSION INTRAVENOUS at 08:22

## 2024-10-14 RX ADMIN — FENTANYL CITRATE 50 MCG: 50 INJECTION, SOLUTION INTRAMUSCULAR; INTRAVENOUS at 08:19

## 2024-10-14 RX ADMIN — LIDOCAINE HYDROCHLORIDE 3 ML: 20 INJECTION, SOLUTION INFILTRATION; PERINEURAL at 08:22

## 2024-10-14 RX ADMIN — ONDANSETRON 4 MG: 2 INJECTION INTRAMUSCULAR; INTRAVENOUS at 08:30

## 2024-10-14 RX ADMIN — PROPOFOL 250 MG: 10 INJECTION, EMULSION INTRAVENOUS at 08:22

## 2024-10-14 RX ADMIN — CEFAZOLIN SODIUM 2 G: 2 INJECTION, SOLUTION INTRAVENOUS at 08:23

## 2024-10-14 RX ADMIN — KETOROLAC TROMETHAMINE 15 MG: 30 INJECTION, SOLUTION INTRAMUSCULAR; INTRAVENOUS at 08:50

## 2024-10-14 RX ADMIN — DEXAMETHASONE SODIUM PHOSPHATE 10 MG: 4 INJECTION, SOLUTION INTRA-ARTICULAR; INTRALESIONAL; INTRAMUSCULAR; INTRAVENOUS; SOFT TISSUE at 08:30

## 2024-10-14 RX ADMIN — ACETAMINOPHEN 975 MG: 325 TABLET ORAL at 07:35

## 2024-10-14 RX ADMIN — SCOLOPAMINE TRANSDERMAL SYSTEM 1 PATCH: 1 PATCH, EXTENDED RELEASE TRANSDERMAL at 07:56

## 2024-10-14 RX ADMIN — SODIUM CHLORIDE, SODIUM LACTATE, POTASSIUM CHLORIDE, CALCIUM CHLORIDE: 600; 310; 30; 20 INJECTION, SOLUTION INTRAVENOUS at 07:56

## 2024-10-14 NOTE — ANESTHESIA POSTPROCEDURE EVALUATION
Patient: Dmitriy Saul    Procedure: Procedure(s):  Cystoscopy, Right Ureteroscopy, Right Retrograde Pyelogram, Right Laser Lithotripsy, Right Ureteral Stent Removal       Anesthesia Type:  General    Note:  Disposition: Outpatient   Postop Pain Control: Uneventful            Sign Out: Well controlled pain   PONV: No   Neuro/Psych: Uneventful            Sign Out: Acceptable/Baseline neuro status   Airway/Respiratory: Uneventful            Sign Out: Acceptable/Baseline resp. status   CV/Hemodynamics: Uneventful            Sign Out: Acceptable CV status; No obvious hypovolemia; No obvious fluid overload   Other NRE:    DID A NON-ROUTINE EVENT OCCUR? No           Last vitals:  Vitals Value Taken Time   /58 10/14/24 0930   Temp 97.2  F (36.2  C) 10/14/24 0902   Pulse 62 10/14/24 0935   Resp 16 10/14/24 0930   SpO2 93 % 10/14/24 0935   Vitals shown include unfiled device data.    Electronically Signed By: Karishma Dias MD  October 14, 2024  11:12 AM

## 2024-10-14 NOTE — ANESTHESIA CARE TRANSFER NOTE
Patient: Dmitriy Saul    Procedure: Procedure(s):  Cystoscopy, Right Ureteroscopy, Right Retrograde Pyelogram, Right Laser Lithotripsy, Right Ureteral Stent Removal       Diagnosis: Nephrolithiasis [N20.0]  Diagnosis Additional Information: No value filed.    Anesthesia Type:   General     Note:    Oropharynx: spontaneously breathing and oral airway in place  Level of Consciousness: drowsy  Oxygen Supplementation: face mask  Level of Supplemental Oxygen (L/min / FiO2): 8  Independent Airway: airway patency satisfactory and stable  Dentition: dentition unchanged  Vital Signs Stable: post-procedure vital signs reviewed and stable  Report to RN Given: handoff report given  Patient transferred to: PACU    Handoff Report: Identifed the Patient, Identified the Reponsible Provider, Reviewed the pertinent medical history, Discussed the surgical course, Reviewed Intra-OP anesthesia mangement and issues during anesthesia, Set expectations for post-procedure period and Allowed opportunity for questions and acknowledgement of understanding    Vitals:  Vitals Value Taken Time   /57 10/14/24 0902   Temp 97.2  F (36.2  C) 10/14/24 0902   Pulse 65 10/14/24 0905   Resp 14 10/14/24 0902   SpO2 95 % 10/14/24 0908   Vitals shown include unfiled device data.    Electronically Signed By: TRINA Estrella CRNA  October 14, 2024  9:10 AM

## 2024-10-14 NOTE — BRIEF OP NOTE
[facility] Brief Operative Note    Pre-operative diagnosis: Nephrolithiasis [N20.0]   Post-operative diagnosis right nephrolithiasis   Procedure: Procedure(s):  Cystoscopy, Right Ureteroscopy, Right Retrograde Pyelogram, Right Laser Lithotripsy, Right Ureteral Stent Removal   Surgeon: Efra Philip MD   Assistants(s): None   Estimated blood loss: 1 ml    Specimens: none   Findings: Few 3 mm right ureteral stones dusted  No renal stones visualized that needed treatment  Aspirated dust from kidney  Significant right hydronephrosis   No stent replaced

## 2024-10-14 NOTE — ANESTHESIA PREPROCEDURE EVALUATION
Anesthesia Pre-Procedure Evaluation    Patient: Dmitriy Saul   MRN: [id number] : 1975        Procedure : Procedure(s):  Cystoscopy, Right Ureteroscopy, Right Retrograde Pyelogram, Right Laser Lithotripsy, Possible Right Ureteral Stent Exchange, Possible Right Ureteral Stent Removal          Past Medical History:   Diagnosis Date    Calculus of kidney       Past Surgical History:   Procedure Laterality Date    COMBINED CYSTOSCOPY, RETROGRADES, URETEROSCOPY, LASER HOLMIUM LITHOTRIPSY URETER(S), INSERT STENT Right 10/2/2024    Procedure: Cystoscopy, Right Ureteroscopy, Right retrograde Pyelogram, Right Holmium Laser Lithotripsy, Right Ureteral Stent Placement;  Surgeon: Troy Gorman MD;  Location: [facility]    LITHOTRIPSY      ?    pneumothorax surgery      in     TONSILLECTOMY      age 7      Allergies   Allergen Reactions    Seasonal Allergies       Social History     Tobacco Use    Smoking status: Never    Smokeless tobacco: Never   Substance Use Topics    Alcohol use: Yes     Comment: occ      Wt Readings from Last 1 Encounters:   10/14/24 108.9 kg (240 lb)        Anesthesia Evaluation   Pt has had prior anesthetic.     No history of anesthetic complications       ROS/MED HX  ENT/Pulmonary:  - neg pulmonary ROS     Neurologic:  - neg neurologic ROS     Cardiovascular:     (+) Dyslipidemia - -   -  - -                                      METS/Exercise Tolerance:     Hematologic:  - neg hematologic  ROS     Musculoskeletal:       GI/Hepatic:  - neg GI/hepatic ROS     Renal/Genitourinary:  - neg Renal ROS     Endo:  - neg endo ROS     Psychiatric/Substance Use:       Infectious Disease:       Malignancy:       Other:            Physical Exam    Airway        Mallampati: I   TM distance: > 3 FB   Neck ROM: full   Mouth opening: > 3 cm    Respiratory Devices and Support         Dental     Comment: Good condition      B=Bridge, C=Chipped, L=Loose, M=Missing    Cardiovascular          Rhythm and  "rate: regular and normal     Pulmonary           breath sounds clear to auscultation           OUTSIDE LABS:  CBC:   Lab Results   Component Value Date    WBC 8.9 05/08/2023    WBC 15.4 (H) 11/27/2018    HGB 16.0 05/08/2023    HGB 15.6 11/27/2018    HCT 48.4 05/08/2023    HCT 46.9 11/27/2018     05/08/2023     11/27/2018     BMP:   Lab Results   Component Value Date     05/08/2023     11/29/2021    POTASSIUM 4.4 05/08/2023    POTASSIUM 4.0 11/29/2021    CHLORIDE 103 05/08/2023    CHLORIDE 107 11/29/2021    CO2 29 05/08/2023    CO2 28 11/29/2021    BUN 13.0 05/08/2023    BUN 14 11/29/2021    CR 0.99 05/08/2023    CR 0.84 11/29/2021    GLC 99 05/08/2023    GLC 98 11/29/2021     COAGS: No results found for: \"PTT\", \"INR\", \"FIBR\"  POC: No results found for: \"BGM\", \"HCG\", \"HCGS\"  HEPATIC:   Lab Results   Component Value Date    ALBUMIN 3.9 11/27/2018    PROTTOTAL 7.5 11/27/2018    ALT 60 11/27/2018    AST 29 11/27/2018    ALKPHOS 72 11/27/2018    BILITOTAL 0.5 11/27/2018     OTHER:   Lab Results   Component Value Date    A1C 5.6 06/11/2024    JOHNIE 9.3 05/08/2023    TSH 0.55 05/08/2023       Anesthesia Plan    ASA Status:  2       Anesthesia Type: General.     - Airway: LMA              Consents    Anesthesia Plan(s) and associated risks, benefits, and realistic alternatives discussed. Questions answered and patient/representative(s) expressed understanding.     - Discussed: Risks, Benefits and Alternatives for BOTH SEDATION and the PROCEDURE were discussed     - Discussed with:  Patient            Postoperative Care            Comments:               Karishma Dias MD    I have reviewed the pertinent notes and labs in the chart from the past 30 days and (re)examined the patient.  Any updates or changes from those notes are reflected in this note.                       # Overweight: Estimated body mass index is 29.21 kg/m  as calculated from the following:    Height as of this encounter: 1.93 m " "(6' 4\").    Weight as of this encounter: 108.9 kg (240 lb).             "

## 2024-10-14 NOTE — INTERVAL H&P NOTE
I reviewed H&P.  Since this H&P, patient has undergone a right-sided ureteroscopy with laser lithotripsy.  Here for secondary ureteroscopy and a planned staged fashion.  Risks of bleeding, infection, stent related discomfort, urinary tract injury were discussed.

## 2024-10-14 NOTE — ANESTHESIA PROCEDURE NOTES
Airway       Patient location during procedure: OR  Staff -        CRNA: Eliane Bronson APRN CRNA       Performed By: CRNA  Consent for Airway        Urgency: elective  Indications and Patient Condition       Indications for airway management: jess-procedural       Induction type:intravenous       Mask difficulty assessment: 1 - vent by mask    Final Airway Details       Final airway type: supraglottic airway    Supraglottic Airway Details        Type: LMA       Brand: Ambu AuraGain       LMA size: 5    Post intubation assessment        Placement verified by: capnometry, equal breath sounds and chest rise        Number of attempts at approach: 1       Secured with: silk tape       Ease of procedure: easy       Dentition: Intact and Unchanged

## 2024-10-15 NOTE — OP NOTE
Instrucciones de rupesh para la bronquiolitis (pediátrica)  A rey hijo le milan diagnosticado bronquiolitis, carol infección viral que provoca inflamación de las vías aéreas pequeñas de los pulmones. Los niños menores de 2 años son más propensos a contraer bronquiolitis. Por lo general, comienza juaquin un resfriado y luego empeora. Algunos niños que tienen bronquiolitis son hospitalizados porque necesitan que se les administre oxígeno para respirar o porque están deshidratados y necesitan más líquidos. Estas instrucciones le ayudarán a cuidar al tom.  Cuidados en la casa  · Déle a rey hijo mucho líquido para prevenir la deshidratación. Pídale al proveedor de atención médica del tom que le indique cuál es la cantidad apropiada.  · Pregúntele también al proveedor si un nebulizador de vapor frío ayudará a rey hijo a respirar mejor. Si usa claudio, asegúrese de limpiarlo todos los días.  · Utilice carol germain de goma de aspiración para extraer la mucosidad de la nariz de rey hijo. Pida al proveedor de atención médica de rey hijo que le muestre cómo deberá usar la germain de goma en la nariz del tom si usted no sabe hacerlo.  · No fume ni permita que nadie fume cerca de rey hijo.  · Recuerde que la respiración con silbidos y la tos de carol bronquiolitis podrían durar varias semanas después de que rey hijo haya sido dado de rupesh. Preste atención a la manera juaquin respira rey hijo para reconocer si los síntomas milan hollie o empeorado.  · Jeff a rey hijo todos los medicamentos exactamente juaquin le indiquen.  Visitas de control  Programe carol visita de control según le indique nuestro personal.     Cuándo debe buscar atención médica  Llame al 911 o a rey servicio local de emergencia de inmediato si el tom tiene cualquiera de estos síntomas:  · Pérdida del conocimiento (desmayo)  · Labios azulados  · Dificultad para respirar, o ha dejado de respirar  En otros casos, llame al proveedor de atención médica de rey hijo de inmediato si nota que el tom  OPERATIVE REPORT    PREOPERATIVE DIAGNOSIS:  Residual right renal stones  POSTOPERATIVE DIAGNOSIS: Same    PROCEDURES PERFORMED:   Cystoscopy  right retrograde pyelogram  right ureteroscopy   right laser lithotripsy  right ureteral stent removal  Interpretation of fluoroscopic images <60 min    STAFF SURGEON: Efra Philip MD  ASSISTANT(S): None  ANESTHESIA: General  ESTIMATED BLOOD LOSS: 1 ml  COMPLICATIONS: None.   SPECIMEN: None    SIGNIFICANT FINDINGS:   Few 3 mm right ureteral stones dusted  No renal stones visualized that needed treatment  Aspirated dust from kidney  Significant right hydronephrosis   No stent replaced    BRIEF OPERATIVE INDICATIONS: Dmitriy Saul  is a(n) 49 year old year old male  who presented with right renal stones s/p ureteroscopy with dusting on 10/2/2024.  It is felt that the dusting was incomplete.  Patient was left with the stent with plan for secondary ureteroscopy for clean up of the remaining fragments.  Of note patient is a .  He has had extreme stent discomfort that is progressively gotten worse.  He strongly desires to not have a stent at the end of the procedure, even if it would entail not fully treating his stone as I would not use an access sheath for plan for his for a stent free procedure.  After a discussion of all risks, benefits, and alternatives, the patient elected to proceed with right ureteroscopy with stent removal.    DESCRIPTION OF PROCEDURE:  After informed consent was obtained, the patient was transported to the operating room & placed supine on the table. After adequate anesthesia was induced, the patient was placed in lithotomy and prepped and draped in the usual sterile fashion. A timeout was taken to confirm correct patient, procedure and laterality. Pre-operative IV antibiotics were administered.     Cystoscopy: Rigid cystoscopy is performed. Anterior and posterior urethra are normal. Prostate unremarkable. Bladder has mucosal inflammation  consistent with indwelling ureteral stent..     Stent Extraction: No stent encrustation is observed. Indwelling right ureteral stent is secured and the distal end brought out to urethral meatus.      Ureteral Access: Right ureteral access is initiated with Bentson wire. Guidewire is placed to kidney through lumen of ureteral stent.      Flexible Ureteroscopy: Flexible ureteroscope is passed to kidney.  There were 2 stones in the proximal ureter that were around 3 mm that were dusted.  Upon entry of the kidney there is severe hydronephrosis present visually and on retrograde pyelogram.  There were 2 areas are question for possible stone in the midpole and lower pole.  The lower pole area was completely clear and is likely due to some bowel contents.  The midpole area was also cleared out could been a dust collection.  Multiple areas were aspirated of dust and debris.  At the end of the procedure not see any stones larger than 1 to 2 mm.  Pullback ureteroscopy is unremarkable and I decided to leave the patient's stent free.    They were awakened from anesthesia and transferred to the PACU.       POSTOP PLAN:   follow-up in 8 weeks with KUB and renal ultrasound and metabolic testing with Dr. Gorman in [place]   tiene cualquiera de estos síntomas:  · Respiración con silbidos que empeora  · Respiración acelerada (más de 60 respiraciones por minuto)  · Respiración irregular: se ve que el tom usa los músculos abdominales o del pecho para respirar.  · Palidez  · Vómitos  IMPORTANTE: Si rey hijo tiene dificultad para respirar, llame de inmediato al 911 (emergencias) o a rey servicio local de emergencias.   © 0893-9802 [email or web address], [employer]. [address] [place] [address], [place] [address]. Todos los derechos reservados. Esta información no pretende sustituir la atención médica profesional. Sólo rey médico puede diagnosticar y tratar un problema de curly.

## 2024-10-18 RX ORDER — CANDIDA ALBICANS 1000 [PNU]/ML
0.3 INJECTION, SOLUTION INTRADERMAL ONCE
Status: COMPLETED | OUTPATIENT
Start: 2024-10-18 | End: 2024-10-11

## 2024-10-28 ENCOUNTER — IMMUNIZATION (OUTPATIENT)
Dept: FAMILY MEDICINE | Facility: CLINIC | Age: 49
End: 2024-10-28
Payer: COMMERCIAL

## 2024-10-28 PROCEDURE — 90656 IIV3 VACC NO PRSV 0.5 ML IM: CPT

## 2024-10-28 PROCEDURE — 90471 IMMUNIZATION ADMIN: CPT

## 2024-11-03 ENCOUNTER — HEALTH MAINTENANCE LETTER (OUTPATIENT)
Age: 49
End: 2024-11-03

## 2024-11-05 NOTE — PROGRESS NOTES
[facility] Dermatology Note    Encounter Date: Nov 6, 2024    Dermatology Problem List:  1. Fibrous papule, R nasal ala, s/p shave bx 4/26/24  2. Verrucous keratosis, L nasal tip s/p shave bx 8/16/24  - LN2 4/26/24, 5/29/24, 7/10/24, 8/16/24, 11/06/2024  3. Warts, L plantar foot  - LN2 4/26/24, 5/29/24, 7/10/24, 8/16/24, 11/06/2024  - Elma 5/29/24, 7/10/24, 8/16/24, 11/06/2024  - Home sal acid    ______________________________________    Impression/Plan:     # inflamed/symptomatic Verrucous keratosis, L nasal tip.   -  s/p shave bx 8/16/24, some recurrent lesion which is bothersome  - cryo today     # Verruca plantaris, L plantar foot x 1 - overall improved, 1 has resolved   - Inject candida #5 today  - continue salicylic acid at home  - discussed efudex as possible treatment option   - recheck at next skin check, call sooner if issues    Procedures Performed:     ELMA INJECTION PROCEDURE NOTE: After verbal consent obtained, 0.3 cc of Candida was injected into 0.3 lesions in above locations. Patient tolerated procedure well and left clinic in good condition.    Cryotherapy procedure note: After verbal consent and discussion of risks and benefits including but no limited to dyspigmentation/scar, blister, and pain, 1 AK and 1 verruca was(were) treated with 1-2mm freeze border for 2 cycles with liquid nitrogen. Post cryotherapy instructions were provided.       Follow-up 05/07/2025 as scheduled     Staff and Scribe:     Scribe Disclosure:   I, Steff Shanks, am serving as a scribe; to document services personally performed by Shama Zimmerman MD -based on data collection and the provider's statements to me.     Provider Disclosure:   The documentation recorded by the scribe accurately reflects the services I personally performed and the decisions made by me.    Shama Zimmerman MD    Department of Dermatology  [facility]  [facility]: Phone: [phone], Fax: [phone]  11/6/2024         ______________________________________    CC:   Chief Complaint   Patient presents with    Derm Problem     Here for wart follow up. Possible candida injection.        History of Present Illness:  Mr. Dmitriy Saul is a 49 year old male who presents as a return patient. .    The patient reports that he has been experiencing some itchiness to the previous lesion on his nasal tip. He notes that he would like this checked as he thinks it may be growing back.       Physical exam:  Vitals: There were no vitals taken for this visit.  GEN: This is a well developed, well-nourished male in no acute distress, in a pleasant mood.    SKIN: Focused examination of the below was performed.  - R plantar 1st toe, very thin hyperkeratotic papule  - L plantar 5th MTP, resolved lesion   - L nasal supratip, slightly scaly flesh colored papule   - No other lesions of concern on areas examined.             Past Medical History:   Past Medical History:   Diagnosis Date    Calculus of kidney      Past Surgical History:   Procedure Laterality Date    COMBINED CYSTOSCOPY, RETROGRADES, URETEROSCOPY, LASER HOLMIUM LITHOTRIPSY URETER(S), INSERT STENT Right 10/2/2024    Procedure: Cystoscopy, Right Ureteroscopy, Right retrograde Pyelogram, Right Holmium Laser Lithotripsy, Right Ureteral Stent Placement;  Surgeon: Troy Gorman MD;  Location: [facility] OR    COMBINED CYSTOSCOPY, RETROGRADES, URETEROSCOPY, LASER HOLMIUM LITHOTRIPSY URETER(S), INSERT STENT Right 10/14/2024    Procedure: Cystoscopy, Right Ureteroscopy, Right Retrograde Pyelogram, Right Laser Lithotripsy, Right Ureteral Stent Removal;  Surgeon: Efra Philip MD;  Location: [facility] OR    LITHOTRIPSY      2007?    pneumothorax surgery      in 1996    TONSILLECTOMY      age 7       Social History:   reports that he has never smoked. He has never used smokeless tobacco. He reports current  alcohol use. He reports that he does not use drugs.    Family History:  Family History   Problem Relation Age of Onset    Breast Cancer Mother 69    Heart block Father     Pancreatic Cancer Maternal Grandmother 77    Prostate Cancer Maternal Grandfather     Skin Cancer Maternal Grandfather     Crohn's Disease Brother     Galactosemia Brother     Other - See Comments Brother     Anesthesia Reaction No family hx of     Malignant Hyperthermia No family hx of        Medications:  Current Outpatient Medications   Medication Sig Dispense Refill    acetaminophen (TYLENOL) 650 MG CR tablet Take 1 tablet (650 mg) by mouth every 6 hours as needed for mild pain or fever. 30 tablet 0    cephALEXin (KEFLEX) 500 MG capsule Take 1 capsule (500 mg) by mouth 2 times daily. 10 capsule 0    cetirizine (ZYRTEC) 10 MG tablet Take 10 mg by mouth daily as needed for allergies      dimenhyDRINATE (DRAMAMINE) 50 MG tablet Take 1 tablet (50 mg) by mouth nightly as needed for sleep (Penile pain). 5 tablet 0    ketorolac (TORADOL) 10 MG tablet Take 1 tablet (10 mg) by mouth every 6 hours as needed for moderate pain. 20 tablet 0    olopatadine (PATANOL) 0.1 % ophthalmic solution 1 drop 2 times daily      phenazopyridine (PYRIDIUM) 100 MG tablet Take 2 tablets (200 mg) by mouth 3 times daily as needed for urinary tract discomfort. 20 tablet 0    SENNA-docusate sodium (SENNA S) 8.6-50 MG tablet Take 1 tablet by mouth daily. For preventing constipation 30 tablet 0    dexAMETHasone (DECADRON) 1 MG tablet Take 1 tablet (1 mg) by mouth once for 1 dose Take a 11 pm night before your lab appointment 1 tablet 0     Allergies   Allergen Reactions    Seasonal Allergies

## 2024-11-06 ENCOUNTER — OFFICE VISIT (OUTPATIENT)
Dept: DERMATOLOGY | Facility: CLINIC | Age: 49
End: 2024-11-06
Payer: COMMERCIAL

## 2024-11-06 DIAGNOSIS — L82.0 INFLAMED SEBORRHEIC KERATOSIS: ICD-10-CM

## 2024-11-06 DIAGNOSIS — B07.0 PLANTAR WART: Primary | ICD-10-CM

## 2024-11-06 PROCEDURE — 11900 INJECT SKIN LESIONS </W 7: CPT | Mod: XS | Performed by: DERMATOLOGY

## 2024-11-06 PROCEDURE — 17110 DESTRUCTION B9 LES UP TO 14: CPT | Performed by: DERMATOLOGY

## 2024-11-06 RX ORDER — CANDIDA ALBICANS 1000 [PNU]/ML
0.3 INJECTION, SOLUTION INTRADERMAL ONCE
Status: COMPLETED | OUTPATIENT
Start: 2024-11-06 | End: 2024-11-06

## 2024-11-06 RX ADMIN — CANDIDA ALBICANS 0.3 ML: 1000 INJECTION, SOLUTION INTRADERMAL at 11:10

## 2024-11-06 ASSESSMENT — PAIN SCALES - GENERAL: PAINLEVEL_OUTOF10: NO PAIN (0)

## 2024-11-06 NOTE — LETTER
11/6/2024       RE: Dmitriy Saul  [address] [facility] [place] [address]     Dear Colleague,    Thank you for referring your patient, Dmitriy Saul, to the [facility] [place] at [facility]. Please see a copy of my visit note below.    [facility] Dermatology Note    Encounter Date: Nov 6, 2024    Dermatology Problem List:  1. Fibrous papule, R nasal ala, s/p shave bx 4/26/24  2. Verrucous keratosis, L nasal tip s/p shave bx 8/16/24  - LN2 4/26/24, 5/29/24, 7/10/24, 8/16/24, 11/06/2024  3. Warts, L plantar foot  - LN2 4/26/24, 5/29/24, 7/10/24, 8/16/24, 11/06/2024  - Denisha 5/29/24, 7/10/24, 8/16/24, 11/06/2024  - Home sal acid    ______________________________________    Impression/Plan:     # inflamed/symptomatic Verrucous keratosis, L nasal tip.   -  s/p shave bx 8/16/24, some recurrent lesion which is bothersome  - cryo today     # Verruca plantaris, L plantar foot x 1 - overall improved, 1 has resolved   - Inject candida #5 today  - continue salicylic acid at home  - discussed efudex as possible treatment option   - recheck at next skin check, call sooner if issues    Procedures Performed:     DENISHA INJECTION PROCEDURE NOTE: After verbal consent obtained, 0.3 cc of Candida was injected into 0.3 lesions in above locations. Patient tolerated procedure well and left clinic in good condition.    Cryotherapy procedure note: After verbal consent and discussion of risks and benefits including but no limited to dyspigmentation/scar, blister, and pain, 1 AK and 1 verruca was(were) treated with 1-2mm freeze border for 2 cycles with liquid nitrogen. Post cryotherapy instructions were provided.       Follow-up 05/07/2025 as scheduled     Staff and Scribe:     Scribe Disclosure:   Steff VELÁZQUEZ, am serving as a scribe; to document services personally performed by Shama Zimmerman MD -based on data collection and  the provider's statements to me.     Provider Disclosure:   The documentation recorded by the scribe accurately reflects the services I personally performed and the decisions made by me.    Shama Zimmerman MD    Department of Dermatology  [facility]: Phone: [phone], Fax: [phone]  11/6/2024         ______________________________________    CC:   Chief Complaint   Patient presents with     Derm Problem     Here for wart follow up. Possible candida injection.        History of Present Illness:  Mr. Dmitriy Saul is a 49 year old male who presents as a return patient. .    The patient reports that he has been experiencing some itchiness to the previous lesion on his nasal tip. He notes that he would like this checked as he thinks it may be growing back.       Physical exam:  Vitals: There were no vitals taken for this visit.  GEN: This is a well developed, well-nourished male in no acute distress, in a pleasant mood.    SKIN: Focused examination of the below was performed.  - R plantar 1st toe, very thin hyperkeratotic papule  - L plantar 5th MTP, resolved lesion   - L nasal supratip, slightly scaly flesh colored papule   - No other lesions of concern on areas examined.             Past Medical History:   Past Medical History:   Diagnosis Date     Calculus of kidney      Past Surgical History:   Procedure Laterality Date     COMBINED CYSTOSCOPY, RETROGRADES, URETEROSCOPY, LASER HOLMIUM LITHOTRIPSY URETER(S), INSERT STENT Right 10/2/2024    Procedure: Cystoscopy, Right Ureteroscopy, Right retrograde Pyelogram, Right Holmium Laser Lithotripsy, Right Ureteral Stent Placement;  Surgeon: Troy Gorman MD;  Location: [facility]     COMBINED CYSTOSCOPY, RETROGRADES, URETEROSCOPY, LASER HOLMIUM LITHOTRIPSY URETER(S), INSERT STENT Right 10/14/2024    Procedure: Cystoscopy, Right Ureteroscopy, Right Retrograde  Pyelogram, Right Laser Lithotripsy, Right Ureteral Stent Removal;  Surgeon: Efra Philip MD;  Location: [facility]     LITHOTRIPSY      2007?     pneumothorax surgery      in 1996     TONSILLECTOMY      age 7       Social History:   reports that he has never smoked. He has never used smokeless tobacco. He reports current alcohol use. He reports that he does not use drugs.    Family History:  Family History   Problem Relation Age of Onset     Breast Cancer Mother 69     Heart block Father      Pancreatic Cancer Maternal Grandmother 77     Prostate Cancer Maternal Grandfather      Skin Cancer Maternal Grandfather      Crohn's Disease Brother      Galactosemia Brother      Other - See Comments Brother      Anesthesia Reaction No family hx of      Malignant Hyperthermia No family hx of        Medications:  Current Outpatient Medications   Medication Sig Dispense Refill     acetaminophen (TYLENOL) 650 MG CR tablet Take 1 tablet (650 mg) by mouth every 6 hours as needed for mild pain or fever. 30 tablet 0     cephALEXin (KEFLEX) 500 MG capsule Take 1 capsule (500 mg) by mouth 2 times daily. 10 capsule 0     cetirizine (ZYRTEC) 10 MG tablet Take 10 mg by mouth daily as needed for allergies       dimenhyDRINATE (DRAMAMINE) 50 MG tablet Take 1 tablet (50 mg) by mouth nightly as needed for sleep (Penile pain). 5 tablet 0     ketorolac (TORADOL) 10 MG tablet Take 1 tablet (10 mg) by mouth every 6 hours as needed for moderate pain. 20 tablet 0     olopatadine (PATANOL) 0.1 % ophthalmic solution 1 drop 2 times daily       phenazopyridine (PYRIDIUM) 100 MG tablet Take 2 tablets (200 mg) by mouth 3 times daily as needed for urinary tract discomfort. 20 tablet 0     SENNA-docusate sodium (SENNA S) 8.6-50 MG tablet Take 1 tablet by mouth daily. For preventing constipation 30 tablet 0     dexAMETHasone (DECADRON) 1 MG tablet Take 1 tablet (1 mg) by mouth once for 1 dose Take a 11 pm night before your lab appointment 1  tablet 0     Allergies   Allergen Reactions     Seasonal Allergies      Again, thank you for allowing me to participate in the care of your patient.      Sincerely,    Shama Zimmerman MD

## 2024-11-06 NOTE — NURSING NOTE
Drug Administration Record    Prior to injection, verified patient identity using patient's name and date of birth.  Due to injection administration, patient instructed to remain in clinic for 15 minutes  afterwards, and to report any adverse reaction to me immediately.    Drug Name: candida antigen  Dose: 0.3mL of candida antigen  Route administered: ID  NDC #: [id number]  Amount of waste(mL):0.7ml  Reason for waste: Multi dose vial    LOT #: [id number]  SITE: See provider note  : [employer]  EXPIRATION DATE: 8/15/26

## 2024-11-06 NOTE — NURSING NOTE
Dermatology Rooming Note    Dmitriy Saul's goals for this visit include:   Chief Complaint   Patient presents with    Derm Problem     Here for wart follow up. Possible di injection.      Nimisha Byrnes RN

## 2024-12-14 ENCOUNTER — HOSPITAL ENCOUNTER (OUTPATIENT)
Dept: ULTRASOUND IMAGING | Facility: HOSPITAL | Age: 49
Discharge: HOME | End: 2024-12-14
Attending: UROLOGY | Admitting: UROLOGY
Payer: COMMERCIAL

## 2024-12-14 ENCOUNTER — HOSPITAL ENCOUNTER (OUTPATIENT)
Dept: GENERAL RADIOLOGY | Facility: CLINIC | Age: 49
Discharge: HOME | End: 2024-12-14
Attending: UROLOGY | Admitting: UROLOGY
Payer: COMMERCIAL

## 2024-12-14 DIAGNOSIS — N20.0 NEPHROLITHIASIS: ICD-10-CM

## 2024-12-14 PROCEDURE — 76775 US EXAM ABDO BACK WALL LIM: CPT

## 2024-12-14 PROCEDURE — 76770 US EXAM ABDO BACK WALL COMP: CPT

## 2024-12-14 PROCEDURE — 74018 RADEX ABDOMEN 1 VIEW: CPT

## 2024-12-17 ENCOUNTER — TRANSFERRED RECORDS (OUTPATIENT)
Dept: HEALTH INFORMATION MANAGEMENT | Facility: CLINIC | Age: 49
End: 2024-12-17

## 2024-12-19 ENCOUNTER — VIRTUAL VISIT (OUTPATIENT)
Dept: UROLOGY | Facility: CLINIC | Age: 49
End: 2024-12-19
Payer: COMMERCIAL

## 2024-12-19 DIAGNOSIS — N20.0 NEPHROLITHIASIS: Primary | ICD-10-CM

## 2024-12-19 PROCEDURE — 99214 OFFICE O/P EST MOD 30 MIN: CPT | Mod: 95 | Performed by: STUDENT IN AN ORGANIZED HEALTH CARE EDUCATION/TRAINING PROGRAM

## 2024-12-19 NOTE — PROGRESS NOTES
UROLOGY OUTPATIENT VISIT      Chief Complaint:   Kidney stone      Synopsis   Dmitriy Saul is a very pleasant AGE: 49 year old year old person    He reports some intermittent right flank pain since March.  He passed a kidney stone in December.  He had lithotripsy in 2006.  Shockwave lithotripsy  He has a history of calcium oxalate stones  He does have a  license    8/6/2024   reviewed the CT scan and by my interpretation it demonstrates 3 stones.  One may be about 9 to 10 mm close renal pelvis.  In 1 smaller lower calyx stone and another very small 2 to 3 mm stone.  There is no hydronephrosis.  There is no left renal stones.     10/2/2024 I did a right URS  Large right renal pelvis stone moved to upper calyx and dusted, large lower pole stone still attached to papilla that due to angle I was unable to basket and remove, I dusted the laser to be smaller but still unable to move, due to the narrow ureter and having to scope without sheath I had limited mobility in lower calyx     10/14/2024 secondary URS with my partner  Few 3 mm right ureteral stones dusted  No renal stones visualized that needed treatment  Aspirated dust from kidney  Significant right hydronephrosis   No stent replaced    12/14/2024  Reviewed KUB    Based on my interpretation there is a 4 mm calcification in the lower pole.     December 19, 2024, today:    - Discussion of ongoing issues with ureteral stents and options for more comfortable alternatives in future procedures.    - Review of recent X-ray and ultrasound results, confirming a small residual stone fragment.    - Concerns about the potential impact of the residual stone on 's license renewal.        Medical Comorbidities      Past Medical History:   Diagnosis Date    Calculus of kidney                Medications     Current Outpatient Medications   Medication Sig Dispense Refill    acetaminophen (TYLENOL) 650 MG CR tablet Take 1 tablet (650 mg) by mouth every 6 hours as  needed for mild pain or fever. 30 tablet 0    cephALEXin (KEFLEX) 500 MG capsule Take 1 capsule (500 mg) by mouth 2 times daily. 10 capsule 0    cetirizine (ZYRTEC) 10 MG tablet Take 10 mg by mouth daily as needed for allergies      dexAMETHasone (DECADRON) 1 MG tablet Take 1 tablet (1 mg) by mouth once for 1 dose Take a 11 pm night before your lab appointment 1 tablet 0    dimenhyDRINATE (DRAMAMINE) 50 MG tablet Take 1 tablet (50 mg) by mouth nightly as needed for sleep (Penile pain). 5 tablet 0    ketorolac (TORADOL) 10 MG tablet Take 1 tablet (10 mg) by mouth every 6 hours as needed for moderate pain. 20 tablet 0    olopatadine (PATANOL) 0.1 % ophthalmic solution 1 drop 2 times daily      phenazopyridine (PYRIDIUM) 100 MG tablet Take 2 tablets (200 mg) by mouth 3 times daily as needed for urinary tract discomfort. 20 tablet 0    SENNA-docusate sodium (SENNA S) 8.6-50 MG tablet Take 1 tablet by mouth daily. For preventing constipation 30 tablet 0     Current Facility-Administered Medications   Medication Dose Route Frequency Provider Last Rate Last Admin    candida albicans skin test injection 0.3 mL  0.3 mL Intradermal Once         candida albicans skin test injection 0.3 mL  0.3 mL Intradermal Once         candida albicans skin test injection 0.3 mL  0.3 mL Intradermal Once                 Assessment/Plan   Dmitriy Saul is a very pleasant AGE: 49 year old year old person with recurrent stone disease.     Nephrolithiasis - he had 3 stones on CT one in pelvis that we did staged URS to treat, the ureter was narrow and stone was dusted and then my partner did secondary URS. He has quite bit of pain with the stent and took him almost 2 weeks to recover. Post-op imaging shows a 4 mm residual stone in the lower calyx. We talked about options of observation. However given his  license and coming up for renewal he is concerned this could preclude him. The location makes it unlikely to spontaneously pass. I dont  think I can reach this with ureteroscopy due to the angle - I had trouble on the initial URS. We could try ESWL as HU around 600 and STSD < 10 cm so feasible. Likely would fragment into even smaller pieces. We discussed the risks and benefits of procedure including bleeding, incomplete fragmentation, bruising and pain. He is interested in proceeding and wants to do this at [facility] or [place]. Plan for KUB within 1 week of surgery    CC:  Michael Hoyos    Time spent:  37minutes spent by me on the date of the encounter doing  video visit

## 2024-12-19 NOTE — NURSING NOTE
Current patient location: [id number] [place] [id number]    Is the patient currently in the state of [place]? YES    Visit mode:VIDEO    If the visit is dropped, the patient can be reconnected by:VIDEO VISIT: Text to cell phone:   Telephone Information:   Mobile [phone]       Will anyone else be joining the visit? NO  (If patient encounters technical issues they should call [phone])    Are changes needed to the allergy or medication list? No and Pt stated no med changes    Are refills needed on medications prescribed by this physician? NO    Rooming Documentation:  Not applicable    Reason for visit: RECHJARED FIGUEROA       3

## 2024-12-19 NOTE — PROGRESS NOTES
Virtual Visit Details    Type of service:  Video Visit   37 minute phone call  Originating Location (pt. Location): Home    Distant Location (provider location):  On-site  Platform used for Video Visit: Cecy

## 2024-12-23 PROBLEM — D12.6 COLON ADENOMA: Status: ACTIVE | Noted: 2024-12-23

## 2024-12-31 ENCOUNTER — PATIENT OUTREACH (OUTPATIENT)
Dept: GASTROENTEROLOGY | Facility: CLINIC | Age: 49
End: 2024-12-31

## 2025-01-09 ENCOUNTER — TELEPHONE (OUTPATIENT)
Dept: UROLOGY | Facility: CLINIC | Age: 50
End: 2025-01-09
Payer: COMMERCIAL

## 2025-01-09 ENCOUNTER — HOSPITAL ENCOUNTER (OUTPATIENT)
Facility: HOSPITAL | Age: 50
End: 2025-01-09
Attending: STUDENT IN AN ORGANIZED HEALTH CARE EDUCATION/TRAINING PROGRAM | Admitting: STUDENT IN AN ORGANIZED HEALTH CARE EDUCATION/TRAINING PROGRAM
Payer: COMMERCIAL

## 2025-01-09 DIAGNOSIS — N20.0 NEPHROLITHIASIS: Primary | ICD-10-CM

## 2025-01-09 NOTE — TELEPHONE ENCOUNTER
[facility] requested for this case RAFFI 1/9/25  Spoke with: Patient      Date of surgery: Tuesday Feb 11 2025 with Dr Gorman      Location: [facility]      Informed patient they will need a adult : YES      Pre op with provider: Patient will schedule with [facility]      H&P Scheduled in PAC NA         Pre procedure covid : Not req      Additional imaging: NA        Surgery Packet :Sent via [employer]      Additional comments:Please call for surgery teaching.

## 2025-01-21 ENCOUNTER — OFFICE VISIT (OUTPATIENT)
Dept: FAMILY MEDICINE | Facility: CLINIC | Age: 50
End: 2025-01-21
Payer: COMMERCIAL

## 2025-01-21 VITALS
SYSTOLIC BLOOD PRESSURE: 118 MMHG | WEIGHT: 251 LBS | HEART RATE: 54 BPM | RESPIRATION RATE: 18 BRPM | BODY MASS INDEX: 30.56 KG/M2 | OXYGEN SATURATION: 98 % | TEMPERATURE: 97.5 F | HEIGHT: 76 IN | DIASTOLIC BLOOD PRESSURE: 70 MMHG

## 2025-01-21 DIAGNOSIS — Z01.818 PREOP GENERAL PHYSICAL EXAM: Primary | ICD-10-CM

## 2025-01-21 DIAGNOSIS — N20.0 NEPHROLITHIASIS: ICD-10-CM

## 2025-01-21 PROCEDURE — 99214 OFFICE O/P EST MOD 30 MIN: CPT | Performed by: FAMILY MEDICINE

## 2025-01-21 ASSESSMENT — PAIN SCALES - GENERAL: PAINLEVEL_OUTOF10: NO PAIN (0)

## 2025-01-21 NOTE — PROGRESS NOTES
Preoperative Evaluation  [facility]  [address] [address]  Phone: [phone]  Primary Provider: Michael Hoyos MD  Pre-op Performing Provider: Greg Suárez DO  Jan 21, 2025 1/16/2025   Surgical Information   What procedure is being done? LITHOTRIPSY, EXTRACORPOREAL SHOCK WAVE    Facility or Hospital where procedure/surgery will be performed: [facility]   Who is doing the procedure / surgery? Dr. Gorman   Date of surgery / procedure: February 11, 2025   Time of surgery / procedure: 2:00 pm   Where do you plan to recover after surgery? at home with family     Fax number for surgical facility: Note does not need to be faxed, will be available electronically in Epic.    Assessment & Plan     The proposed surgical procedure is considered INTERMEDIATE risk.    Preop general physical exam  Mets>4   No chest pain or shortness of breath at rest or with activity.       Nephrolithiasis  Scheduled for above procedure.           - No identified additional risk factors other than previously addressed    Preoperative Medication Instructions  Antiplatelet or Anticoagulation Medication Instructions   - Patient is on no antiplatelet or anticoagulation medications.    Additional Medication Instructions  Patient is on no additional chronic medications    Recommendation  Approval given to proceed with proposed procedure, without further diagnostic evaluation.    Jordin Izaguirre is a 49 year old, presenting for the following:  Pre-Op Exam          1/21/2025     3:12 PM   Additional Questions   Roomed by Britt LOMELI         1/21/2025   Forms   Any forms needing to be completed Yes     HPI related to upcoming procedure:     Recurrent nephrolithiasis    See office note from Dr. Gorman on 12/19/2024 for full details.           1/16/2025   Pre-Op Questionnaire   Have you ever had a heart attack or stroke? No   Have you ever had surgery on your heart or blood  vessels, such as a stent placement, a coronary artery bypass, or surgery on an artery in your head, neck, heart, or legs? No   Do you have chest pain with activity? No   Do you have a history of heart failure? No   Do you currently have a cold, bronchitis or symptoms of other infection? No   Do you have a cough, shortness of breath, or wheezing? No   Do you or anyone in your family have previous history of blood clots? No   Do you or does anyone in your family have a serious bleeding problem such as prolonged bleeding following surgeries or cuts? No   Have you ever had problems with anemia or been told to take iron pills? No   Have you had any abnormal blood loss such as black, tarry or bloody stools? No   Have you ever had a blood transfusion? No   Are you willing to have a blood transfusion if it is medically needed before, during, or after your surgery? Yes   Have you or any of your relatives ever had problems with anesthesia? No   Do you have sleep apnea, excessive snoring or daytime drowsiness? No   Do you have any artifical heart valves or other implanted medical devices like a pacemaker, defibrillator, or continuous glucose monitor? No   Do you have artificial joints? No   Are you allergic to latex? No     Health Care Directive  Patient does not have a Health Care Directive: Discussed advance care planning with patient; information given to patient to review.    Preoperative Review of    reviewed - no record of controlled substances prescribed.      Status of Chronic Conditions:  See problem list for active medical problems.  Problems all longstanding and stable, except as noted/documented.  See ROS for pertinent symptoms related to these conditions.    Patient Active Problem List    Diagnosis Date Noted    Colon adenoma 12/23/2024     Priority: Medium    Nephrolithiasis 10/02/2024     Priority: Medium    Renal cyst 09/23/2024     Priority: Medium    Facial lesion 08/16/2024     Priority: Medium     History of renal stone 06/11/2024     Priority: Medium    History of dysplastic nevus 04/04/2022     Priority: Medium     Formatting of this note might be different from the original.  DN, left abdomen, moderate atypia, s/p shave 12/5/2011      Hyperlipidemia 10/12/2015     Priority: Medium    Calculus of kidney 09/19/2006     Priority: Medium     Formatting of this note might be different from the original.  2001, calium oxalate        Past Medical History:   Diagnosis Date    Calculus of kidney      Past Surgical History:   Procedure Laterality Date    ABDOMEN SURGERY  1995    Pneumothorax Spontaneous THORACOSCOPY    COMBINED CYSTOSCOPY, RETROGRADES, URETEROSCOPY, LASER HOLMIUM LITHOTRIPSY URETER(S), INSERT STENT Right 10/02/2024    Procedure: Cystoscopy, Right Ureteroscopy, Right retrograde Pyelogram, Right Holmium Laser Lithotripsy, Right Ureteral Stent Placement;  Surgeon: Troy Gorman MD;  Location: [facility] OR    COMBINED CYSTOSCOPY, RETROGRADES, URETEROSCOPY, LASER HOLMIUM LITHOTRIPSY URETER(S), INSERT STENT Right 10/14/2024    Procedure: Cystoscopy, Right Ureteroscopy, Right Retrograde Pyelogram, Right Laser Lithotripsy, Right Ureteral Stent Removal;  Surgeon: Efra Philip MD;  Location: [facility]    LITHOTRIPSY      2007?    pneumothorax surgery      in 1996    TONSILLECTOMY      age 7     Current Outpatient Medications   Medication Sig Dispense Refill    cetirizine (ZYRTEC) 10 MG tablet Take 10 mg by mouth daily as needed for allergies (Patient not taking: Reported on 1/21/2025)      olopatadine (PATANOL) 0.1 % ophthalmic solution 1 drop 2 times daily (Patient not taking: Reported on 1/21/2025)         Allergies   Allergen Reactions    Seasonal Allergies         Social History     Tobacco Use    Smoking status: Never    Smokeless tobacco: Never   Substance Use Topics    Alcohol use: Yes     Comment: occ       History   Drug Use No             Review of Systems  Constitutional, neuro, ENT,  "endocrine, pulmonary, cardiac, gastrointestinal, genitourinary, musculoskeletal, integument and psychiatric systems are negative, except as otherwise noted.    Objective    /70 (BP Location: Right arm, Patient Position: Chair, Cuff Size: Adult Regular)   Pulse 54   Temp 97.5  F (36.4  C) (Oral)   Resp 18   Ht 1.937 m (6' 4.25\")   Wt 113.9 kg (251 lb)   SpO2 98%   BMI 30.35 kg/m     Estimated body mass index is 30.35 kg/m  as calculated from the following:    Height as of this encounter: 1.937 m (6' 4.25\").    Weight as of this encounter: 113.9 kg (251 lb).  Physical Exam  GENERAL: alert and no distress  EYES: Eyes grossly normal to inspection, PERRL and conjunctivae and sclerae normal  HENT: ear canals and TM's normal, nose and mouth without ulcers or lesions  NECK: no adenopathy, no asymmetry, masses, or scars  RESP: lungs clear to auscultation - no rales, rhonchi or wheezes  CV: regular rate and rhythm, normal S1 S2, no S3 or S4, no murmur, click or rub, no peripheral edema  ABDOMEN: soft, nontender, no hepatosplenomegaly, no masses and bowel sounds normal  MS: no gross musculoskeletal defects noted, no edema  SKIN: no suspicious lesions or rashes  NEURO: Normal strength and tone, mentation intact and speech normal  PSYCH: mentation appears normal, affect normal/bright    Recent Labs   Lab Test 06/11/24  1138   A1C 5.6        Diagnostics  No labs were ordered during this visit.   No EKG required, no history of coronary heart disease, significant arrhythmia, peripheral arterial disease or other structural heart disease.    Revised Cardiac Risk Index (RCRI)  The patient has the following serious cardiovascular risks for perioperative complications:   - No serious cardiac risks = 0 points     RCRI Interpretation: 0 points: Class I (very low risk - 0.4% complication rate)         Signed Electronically by: Greg Suárez DO  A copy of this evaluation report is provided to the requesting " physician.

## 2025-01-21 NOTE — H&P (VIEW-ONLY)
Preoperative Evaluation  [facility]  [address] [address]  Phone: [phone]  Primary Provider: Michael Hoyos MD  Pre-op Performing Provider: Greg Suárez DO  Jan 21, 2025 1/16/2025   Surgical Information   What procedure is being done? LITHOTRIPSY, EXTRACORPOREAL SHOCK WAVE    Facility or Hospital where procedure/surgery will be performed: [facility]   Who is doing the procedure / surgery? Dr. Gorman   Date of surgery / procedure: February 11, 2025   Time of surgery / procedure: 2:00 pm   Where do you plan to recover after surgery? at home with family     Fax number for surgical facility: Note does not need to be faxed, will be available electronically in Epic.    Assessment & Plan     The proposed surgical procedure is considered INTERMEDIATE risk.    Preop general physical exam  Mets>4   No chest pain or shortness of breath at rest or with activity.       Nephrolithiasis  Scheduled for above procedure.           - No identified additional risk factors other than previously addressed    Preoperative Medication Instructions  Antiplatelet or Anticoagulation Medication Instructions   - Patient is on no antiplatelet or anticoagulation medications.    Additional Medication Instructions  Patient is on no additional chronic medications    Recommendation  Approval given to proceed with proposed procedure, without further diagnostic evaluation.    Jordin Izaguirre is a 49 year old, presenting for the following:  Pre-Op Exam          1/21/2025     3:12 PM   Additional Questions   Roomed by Britt LOMELI         1/21/2025   Forms   Any forms needing to be completed Yes     HPI related to upcoming procedure:     Recurrent nephrolithiasis    See office note from Dr. Gorman on 12/19/2024 for full details.           1/16/2025   Pre-Op Questionnaire   Have you ever had a heart attack or stroke? No   Have you ever had surgery on your heart or blood  vessels, such as a stent placement, a coronary artery bypass, or surgery on an artery in your head, neck, heart, or legs? No   Do you have chest pain with activity? No   Do you have a history of heart failure? No   Do you currently have a cold, bronchitis or symptoms of other infection? No   Do you have a cough, shortness of breath, or wheezing? No   Do you or anyone in your family have previous history of blood clots? No   Do you or does anyone in your family have a serious bleeding problem such as prolonged bleeding following surgeries or cuts? No   Have you ever had problems with anemia or been told to take iron pills? No   Have you had any abnormal blood loss such as black, tarry or bloody stools? No   Have you ever had a blood transfusion? No   Are you willing to have a blood transfusion if it is medically needed before, during, or after your surgery? Yes   Have you or any of your relatives ever had problems with anesthesia? No   Do you have sleep apnea, excessive snoring or daytime drowsiness? No   Do you have any artifical heart valves or other implanted medical devices like a pacemaker, defibrillator, or continuous glucose monitor? No   Do you have artificial joints? No   Are you allergic to latex? No     Health Care Directive  Patient does not have a Health Care Directive: Discussed advance care planning with patient; information given to patient to review.    Preoperative Review of    reviewed - no record of controlled substances prescribed.      Status of Chronic Conditions:  See problem list for active medical problems.  Problems all longstanding and stable, except as noted/documented.  See ROS for pertinent symptoms related to these conditions.    Patient Active Problem List    Diagnosis Date Noted    Colon adenoma 12/23/2024     Priority: Medium    Nephrolithiasis 10/02/2024     Priority: Medium    Renal cyst 09/23/2024     Priority: Medium    Facial lesion 08/16/2024     Priority: Medium     History of renal stone 06/11/2024     Priority: Medium    History of dysplastic nevus 04/04/2022     Priority: Medium     Formatting of this note might be different from the original.  DN, left abdomen, moderate atypia, s/p shave 12/5/2011      Hyperlipidemia 10/12/2015     Priority: Medium    Calculus of kidney 09/19/2006     Priority: Medium     Formatting of this note might be different from the original.  2001, calium oxalate        Past Medical History:   Diagnosis Date    Calculus of kidney      Past Surgical History:   Procedure Laterality Date    ABDOMEN SURGERY  1995    Pneumothorax Spontaneous THORACOSCOPY    COMBINED CYSTOSCOPY, RETROGRADES, URETEROSCOPY, LASER HOLMIUM LITHOTRIPSY URETER(S), INSERT STENT Right 10/02/2024    Procedure: Cystoscopy, Right Ureteroscopy, Right retrograde Pyelogram, Right Holmium Laser Lithotripsy, Right Ureteral Stent Placement;  Surgeon: Troy Gorman MD;  Location: [facility] OR    COMBINED CYSTOSCOPY, RETROGRADES, URETEROSCOPY, LASER HOLMIUM LITHOTRIPSY URETER(S), INSERT STENT Right 10/14/2024    Procedure: Cystoscopy, Right Ureteroscopy, Right Retrograde Pyelogram, Right Laser Lithotripsy, Right Ureteral Stent Removal;  Surgeon: Efra Philip MD;  Location: [facility] OR    LITHOTRIPSY      2007?    pneumothorax surgery      in 1996    TONSILLECTOMY      age 7     Current Outpatient Medications   Medication Sig Dispense Refill    cetirizine (ZYRTEC) 10 MG tablet Take 10 mg by mouth daily as needed for allergies (Patient not taking: Reported on 1/21/2025)      olopatadine (PATANOL) 0.1 % ophthalmic solution 1 drop 2 times daily (Patient not taking: Reported on 1/21/2025)         Allergies   Allergen Reactions    Seasonal Allergies         Social History     Tobacco Use    Smoking status: Never    Smokeless tobacco: Never   Substance Use Topics    Alcohol use: Yes     Comment: occ       History   Drug Use No             Review of Systems  Constitutional, neuro, ENT,  "endocrine, pulmonary, cardiac, gastrointestinal, genitourinary, musculoskeletal, integument and psychiatric systems are negative, except as otherwise noted.    Objective    /70 (BP Location: Right arm, Patient Position: Chair, Cuff Size: Adult Regular)   Pulse 54   Temp 97.5  F (36.4  C) (Oral)   Resp 18   Ht 1.937 m (6' 4.25\")   Wt 113.9 kg (251 lb)   SpO2 98%   BMI 30.35 kg/m     Estimated body mass index is 30.35 kg/m  as calculated from the following:    Height as of this encounter: 1.937 m (6' 4.25\").    Weight as of this encounter: 113.9 kg (251 lb).  Physical Exam  GENERAL: alert and no distress  EYES: Eyes grossly normal to inspection, PERRL and conjunctivae and sclerae normal  HENT: ear canals and TM's normal, nose and mouth without ulcers or lesions  NECK: no adenopathy, no asymmetry, masses, or scars  RESP: lungs clear to auscultation - no rales, rhonchi or wheezes  CV: regular rate and rhythm, normal S1 S2, no S3 or S4, no murmur, click or rub, no peripheral edema  ABDOMEN: soft, nontender, no hepatosplenomegaly, no masses and bowel sounds normal  MS: no gross musculoskeletal defects noted, no edema  SKIN: no suspicious lesions or rashes  NEURO: Normal strength and tone, mentation intact and speech normal  PSYCH: mentation appears normal, affect normal/bright    Recent Labs   Lab Test 06/11/24  1138   A1C 5.6        Diagnostics  No labs were ordered during this visit.   No EKG required, no history of coronary heart disease, significant arrhythmia, peripheral arterial disease or other structural heart disease.    Revised Cardiac Risk Index (RCRI)  The patient has the following serious cardiovascular risks for perioperative complications:   - No serious cardiac risks = 0 points     RCRI Interpretation: 0 points: Class I (very low risk - 0.4% complication rate)         Signed Electronically by: Greg Suárez DO  A copy of this evaluation report is provided to the requesting " physician.

## 2025-02-03 ENCOUNTER — TELEPHONE (OUTPATIENT)
Dept: UROLOGY | Facility: CLINIC | Age: 50
End: 2025-02-03

## 2025-02-03 ENCOUNTER — LAB (OUTPATIENT)
Dept: LAB | Facility: CLINIC | Age: 50
End: 2025-02-03
Payer: COMMERCIAL

## 2025-02-03 DIAGNOSIS — N20.0 NEPHROLITHIASIS: ICD-10-CM

## 2025-02-03 DIAGNOSIS — N20.0 NEPHROLITHIASIS: Primary | ICD-10-CM

## 2025-02-03 PROCEDURE — 87086 URINE CULTURE/COLONY COUNT: CPT

## 2025-02-03 NOTE — TELEPHONE ENCOUNTER
Spoke with patient who will go into [place] lab today to do his urine culture.  Order in chart, patient has no other questions.  Petra Kovacs RN

## 2025-02-04 LAB — BACTERIA UR CULT: NORMAL

## 2025-02-07 NOTE — OR NURSING
"Pre-op call RN Assessment    Patient very anxious about IV placements, states last few times he's had procedures/surgeries the IV was the worst part. Requesting ultrasound IV placement or PICC team instead of \"being a pin cushion.\"  "

## 2025-02-11 ENCOUNTER — APPOINTMENT (OUTPATIENT)
Dept: RADIOLOGY | Facility: HOSPITAL | Age: 50
End: 2025-02-11
Attending: STUDENT IN AN ORGANIZED HEALTH CARE EDUCATION/TRAINING PROGRAM
Payer: COMMERCIAL

## 2025-02-11 ENCOUNTER — ANESTHESIA EVENT (OUTPATIENT)
Dept: SURGERY | Facility: HOSPITAL | Age: 50
End: 2025-02-11
Payer: COMMERCIAL

## 2025-02-11 ENCOUNTER — ANESTHESIA (OUTPATIENT)
Dept: SURGERY | Facility: HOSPITAL | Age: 50
End: 2025-02-11
Payer: COMMERCIAL

## 2025-02-11 VITALS
OXYGEN SATURATION: 97 % | TEMPERATURE: 98.6 F | DIASTOLIC BLOOD PRESSURE: 75 MMHG | BODY MASS INDEX: 30.47 KG/M2 | HEART RATE: 65 BPM | WEIGHT: 252 LBS | SYSTOLIC BLOOD PRESSURE: 128 MMHG | RESPIRATION RATE: 18 BRPM

## 2025-02-11 PROCEDURE — 360N000077 HC SURGERY LEVEL 4, PER MIN: Performed by: STUDENT IN AN ORGANIZED HEALTH CARE EDUCATION/TRAINING PROGRAM

## 2025-02-11 PROCEDURE — 250N000009 HC RX 250: Performed by: NURSE ANESTHETIST, CERTIFIED REGISTERED

## 2025-02-11 PROCEDURE — 250N000011 HC RX IP 250 OP 636: Performed by: STUDENT IN AN ORGANIZED HEALTH CARE EDUCATION/TRAINING PROGRAM

## 2025-02-11 PROCEDURE — 74018 RADEX ABDOMEN 1 VIEW: CPT

## 2025-02-11 PROCEDURE — 999N000141 HC STATISTIC PRE-PROCEDURE NURSING ASSESSMENT: Performed by: STUDENT IN AN ORGANIZED HEALTH CARE EDUCATION/TRAINING PROGRAM

## 2025-02-11 PROCEDURE — 258N000003 HC RX IP 258 OP 636: Performed by: NURSE ANESTHETIST, CERTIFIED REGISTERED

## 2025-02-11 PROCEDURE — 50590 FRAGMENTING OF KIDNEY STONE: CPT | Mod: RT | Performed by: STUDENT IN AN ORGANIZED HEALTH CARE EDUCATION/TRAINING PROGRAM

## 2025-02-11 PROCEDURE — 272N000002 HC OR SUPPLY OTHER OPNP: Performed by: STUDENT IN AN ORGANIZED HEALTH CARE EDUCATION/TRAINING PROGRAM

## 2025-02-11 PROCEDURE — 710N000012 HC RECOVERY PHASE 2, PER MINUTE: Performed by: STUDENT IN AN ORGANIZED HEALTH CARE EDUCATION/TRAINING PROGRAM

## 2025-02-11 PROCEDURE — 370N000017 HC ANESTHESIA TECHNICAL FEE, PER MIN: Performed by: STUDENT IN AN ORGANIZED HEALTH CARE EDUCATION/TRAINING PROGRAM

## 2025-02-11 PROCEDURE — 250N000013 HC RX MED GY IP 250 OP 250 PS 637: Performed by: STUDENT IN AN ORGANIZED HEALTH CARE EDUCATION/TRAINING PROGRAM

## 2025-02-11 PROCEDURE — 250N000011 HC RX IP 250 OP 636: Performed by: NURSE ANESTHETIST, CERTIFIED REGISTERED

## 2025-02-11 RX ORDER — SODIUM CHLORIDE, SODIUM LACTATE, POTASSIUM CHLORIDE, CALCIUM CHLORIDE 600; 310; 30; 20 MG/100ML; MG/100ML; MG/100ML; MG/100ML
INJECTION, SOLUTION INTRAVENOUS CONTINUOUS PRN
Status: DISCONTINUED | OUTPATIENT
Start: 2025-02-11 | End: 2025-02-11

## 2025-02-11 RX ORDER — CEFAZOLIN SODIUM/WATER 2 G/20 ML
2 SYRINGE (ML) INTRAVENOUS SEE ADMIN INSTRUCTIONS
Status: DISCONTINUED | OUTPATIENT
Start: 2025-02-11 | End: 2025-02-11 | Stop reason: HOSPADM

## 2025-02-11 RX ORDER — FENTANYL CITRATE 50 UG/ML
INJECTION, SOLUTION INTRAMUSCULAR; INTRAVENOUS PRN
Status: DISCONTINUED | OUTPATIENT
Start: 2025-02-11 | End: 2025-02-11

## 2025-02-11 RX ORDER — OXYCODONE HYDROCHLORIDE 5 MG/1
10 TABLET ORAL
Status: DISCONTINUED | OUTPATIENT
Start: 2025-02-11 | End: 2025-02-11 | Stop reason: HOSPADM

## 2025-02-11 RX ORDER — PROPOFOL 10 MG/ML
INJECTION, EMULSION INTRAVENOUS PRN
Status: DISCONTINUED | OUTPATIENT
Start: 2025-02-11 | End: 2025-02-11

## 2025-02-11 RX ORDER — LIDOCAINE HYDROCHLORIDE 10 MG/ML
INJECTION, SOLUTION INFILTRATION; PERINEURAL PRN
Status: DISCONTINUED | OUTPATIENT
Start: 2025-02-11 | End: 2025-02-11

## 2025-02-11 RX ORDER — ONDANSETRON 4 MG/1
4 TABLET, ORALLY DISINTEGRATING ORAL EVERY 30 MIN PRN
Status: DISCONTINUED | OUTPATIENT
Start: 2025-02-11 | End: 2025-02-11 | Stop reason: HOSPADM

## 2025-02-11 RX ORDER — ACETAMINOPHEN 325 MG/1
975 TABLET ORAL ONCE
Status: COMPLETED | OUTPATIENT
Start: 2025-02-11 | End: 2025-02-11

## 2025-02-11 RX ORDER — ONDANSETRON 2 MG/ML
4 INJECTION INTRAMUSCULAR; INTRAVENOUS EVERY 30 MIN PRN
Status: DISCONTINUED | OUTPATIENT
Start: 2025-02-11 | End: 2025-02-11 | Stop reason: HOSPADM

## 2025-02-11 RX ORDER — CEFAZOLIN SODIUM/WATER 2 G/20 ML
2 SYRINGE (ML) INTRAVENOUS
Status: COMPLETED | OUTPATIENT
Start: 2025-02-11 | End: 2025-02-11

## 2025-02-11 RX ORDER — ONDANSETRON 2 MG/ML
INJECTION INTRAMUSCULAR; INTRAVENOUS PRN
Status: DISCONTINUED | OUTPATIENT
Start: 2025-02-11 | End: 2025-02-11

## 2025-02-11 RX ORDER — DEXAMETHASONE SODIUM PHOSPHATE 10 MG/ML
INJECTION, SOLUTION INTRAMUSCULAR; INTRAVENOUS PRN
Status: DISCONTINUED | OUTPATIENT
Start: 2025-02-11 | End: 2025-02-11

## 2025-02-11 RX ORDER — GLYCOPYRROLATE 0.2 MG/ML
INJECTION, SOLUTION INTRAMUSCULAR; INTRAVENOUS PRN
Status: DISCONTINUED | OUTPATIENT
Start: 2025-02-11 | End: 2025-02-11

## 2025-02-11 RX ORDER — NALOXONE HYDROCHLORIDE 0.4 MG/ML
0.1 INJECTION, SOLUTION INTRAMUSCULAR; INTRAVENOUS; SUBCUTANEOUS
Status: DISCONTINUED | OUTPATIENT
Start: 2025-02-11 | End: 2025-02-11 | Stop reason: HOSPADM

## 2025-02-11 RX ORDER — ACETAMINOPHEN 650 MG/1
650 SUPPOSITORY RECTAL ONCE
Status: COMPLETED | OUTPATIENT
Start: 2025-02-11 | End: 2025-02-11

## 2025-02-11 RX ORDER — SCOPOLAMINE 1 MG/3D
PATCH, EXTENDED RELEASE TRANSDERMAL
Status: DISCONTINUED
Start: 2025-02-11 | End: 2025-02-11 | Stop reason: HOSPADM

## 2025-02-11 RX ORDER — OXYCODONE HYDROCHLORIDE 5 MG/1
5 TABLET ORAL
Status: DISCONTINUED | OUTPATIENT
Start: 2025-02-11 | End: 2025-02-11 | Stop reason: HOSPADM

## 2025-02-11 RX ORDER — DEXAMETHASONE SODIUM PHOSPHATE 10 MG/ML
4 INJECTION, SOLUTION INTRAMUSCULAR; INTRAVENOUS
Status: DISCONTINUED | OUTPATIENT
Start: 2025-02-11 | End: 2025-02-11 | Stop reason: HOSPADM

## 2025-02-11 RX ORDER — PROPOFOL 10 MG/ML
INJECTION, EMULSION INTRAVENOUS CONTINUOUS PRN
Status: DISCONTINUED | OUTPATIENT
Start: 2025-02-11 | End: 2025-02-11

## 2025-02-11 RX ADMIN — PROPOFOL 30 MG: 10 INJECTION, EMULSION INTRAVENOUS at 12:48

## 2025-02-11 RX ADMIN — FENTANYL CITRATE 100 MCG: 50 INJECTION, SOLUTION INTRAMUSCULAR; INTRAVENOUS at 12:43

## 2025-02-11 RX ADMIN — PROPOFOL 30 MG: 10 INJECTION, EMULSION INTRAVENOUS at 12:43

## 2025-02-11 RX ADMIN — PROPOFOL 150 MCG/KG/MIN: 10 INJECTION, EMULSION INTRAVENOUS at 12:43

## 2025-02-11 RX ADMIN — ACETAMINOPHEN 975 MG: 325 TABLET ORAL at 12:07

## 2025-02-11 RX ADMIN — SODIUM CHLORIDE, POTASSIUM CHLORIDE, SODIUM LACTATE AND CALCIUM CHLORIDE: 600; 310; 30; 20 INJECTION, SOLUTION INTRAVENOUS at 12:34

## 2025-02-11 RX ADMIN — ONDANSETRON 4 MG: 2 INJECTION INTRAMUSCULAR; INTRAVENOUS at 12:43

## 2025-02-11 RX ADMIN — MIDAZOLAM HYDROCHLORIDE 2 MG: 1 INJECTION, SOLUTION INTRAMUSCULAR; INTRAVENOUS at 12:34

## 2025-02-11 RX ADMIN — LIDOCAINE HYDROCHLORIDE 5 ML: 10 INJECTION, SOLUTION INFILTRATION; PERINEURAL at 12:43

## 2025-02-11 RX ADMIN — Medication 2 G: at 12:43

## 2025-02-11 RX ADMIN — GLYCOPYRROLATE 0.2 MG: 0.2 INJECTION INTRAMUSCULAR; INTRAVENOUS at 12:43

## 2025-02-11 RX ADMIN — DEXAMETHASONE SODIUM PHOSPHATE 10 MG: 10 INJECTION, SOLUTION INTRAMUSCULAR; INTRAVENOUS at 12:43

## 2025-02-11 ASSESSMENT — ACTIVITIES OF DAILY LIVING (ADL)
ADLS_ACUITY_SCORE: 41

## 2025-02-11 NOTE — OP NOTE
OPERATIVE REPORT    PREOPERATIVE DIAGNOSIS:  RIGHT renal stone    POSTOPERATIVE DIAGNOSIS: Same    PROCEDURES PERFORMED:   RIGHT extracorporeal shockwave lithotripsy (ESWL)    STAFF SURGEON: Troy Gorman MD  ASSISTANT(S): none  ANESTHESIA: General/LMA  ESTIMATED BLOOD LOSS: 0 ml  COMPLICATIONS: None.   SPECIMEN: none    SIGNIFICANT FINDINGS:   right stone with comminution    BRIEF OPERATIVE INDICATIONS: Dmitriy Saul  is an 49 year old male who presented with a right renal pelvis stone and lower pole stones. I did a R URS on him and due to narrow ureter I had to dust and my partner did a secondary ureteroscopy and didn't find any residual stone. However a KUB showed a 4 mm lower pole fragment. I suspect he has challenging lower pole calyx to reach in retrograde approach. I didn't think this would be issue but given he has  license he needs to be clear so we discussed trying ESWL to see if we can clear the residual fragment  After a discussion of all risks, benefits, and alternatives, the patient elected to proceed with right ESWL.    DESCRIPTION OF PROCEDURE:  After informed consent was obtained, the patient was transported to the operating room & placed supine on the table. After adequate anesthesia was induced, the patient was placed in supine and prepped and draped in the usual sterile fashion. Liquid was placed under back in between the therapy head and no air bubbles seen in the interface. A timeout was taken to confirm correct patient, procedure and laterality. Pre-operative IV antibiotics were administered.     The right stone was localized by means of biplaner fluoroscopy. SWL was then administered to the calculi at the F-2 focal point.   The patient's stone was treated with a total of 2500 shocks with the total energy ranging between level 3 to 4  with a 2 min pause after 200 shocks.   Initial fragmentation was noted after 750 shocks. The stone demonstrated excellent  comminution/fragmentation at the end of the procedure.  A total of 2000 shocks were delivered  The patient received intraveous antibiotic cefazolin.   The patient tolerated the procedure well, there were no complications.  The patient was sent to the recovery room in stable condition.     They were awakened from anesthesia and transferred to the PACU.       POSTOP PLAN:  Follow-up 6-8 weeks with GILLIAN Gorman MD

## 2025-02-11 NOTE — DISCHARGE INSTRUCTIONS
Discharge Instructions After Extracorporeal Shock Wave Lithotripsy (ESWL) for Right Kidney Stone    Following your recent ESWL procedure for the treatment of a kidney stone in your right kidney, please adhere to the following discharge instructions to ensure a smooth recovery:    A. Activity:  Rest for the remainder of the day and resume normal activities as tolerated. Avoid strenuous activities and heavy lifting for at least 72 hours.  Gradually increase your activity level over the next few days.    2. Pain Management:  It is normal to experience some discomfort or pain after the procedure. Use over-the-counter pain relievers such as acetaminophen (Tylenol) or ibuprofen (Advil, Motrin) as needed. Follow the dosage instructions on the label.  If stronger pain medication was prescribed, take it as directed.    3. Hydration:  Drink plenty of fluids to help flush out stone fragments. Aim for at least 8-10 glasses of water per day unless otherwise directed by your physician.  Avoid caffeine and alcohol as they can dehydrate you.    4. Diet:  You may resume your normal diet. Eating a healthy, balanced diet can help prevent future stones.    5. Urination:    You may notice blood in your urine for a few days after the procedure, which is normal. Continue to drink plenty of water to help clear this.  Strain your urine if instructed by your physician to catch any stone fragments for analysis.    6.   Follow-Up:  - Follow up with Dr. Gorman in around 6 weeks with XRAY of your abdomen (ordered)  - Radiology [place] [phone] to schedule this    7. When to Seek Medical Attention:  Contact your physician or seek medical care if you experience severe pain, fever, chills, persistent nausea or vomiting, difficulty urinating, or if you notice large amounts of blood in your urine.    Phone numbers:   - Monday through Friday 8am to 4:30pm: Call [phone] with questions or to schedule or confirm appointment. Or for any  questions. Someone should answer 24h they will usually call on-call urologist

## 2025-02-11 NOTE — ANESTHESIA POSTPROCEDURE EVALUATION
Patient: Dmitriy Saul    Procedure: Procedure(s):  LITHOTRIPSY, EXTRACORPOREAL SHOCK WAVE       Anesthesia Type:  General    Note:  Disposition: Outpatient   Postop Pain Control: Uneventful            Sign Out: Well controlled pain   PONV: No   Neuro/Psych: Uneventful            Sign Out: Acceptable/Baseline neuro status   Airway/Respiratory: Uneventful            Sign Out: Acceptable/Baseline resp. status   CV/Hemodynamics: Uneventful            Sign Out: Acceptable CV status; No obvious hypovolemia; No obvious fluid overload   Other NRE: NONE   DID A NON-ROUTINE EVENT OCCUR? No           Last vitals:  Vitals Value Taken Time   /75 02/11/25 1400   Temp     Pulse 66 02/11/25 1412   Resp 18 02/11/25 1345   SpO2 96 % 02/11/25 1412   Vitals shown include unfiled device data.    Electronically Signed By: Bryan Georges MD  February 11, 2025  3:22 PM

## 2025-02-11 NOTE — ANESTHESIA PREPROCEDURE EVALUATION
Anesthesia Pre-Procedure Evaluation    Patient: Dmitriy Saul   MRN: [id number] : 1975        Procedure : Procedure(s):  LITHOTRIPSY, EXTRACORPOREAL SHOCK WAVE          Past Medical History:   Diagnosis Date    Calculus of kidney     Colon adenoma     Dysplastic nevus     HLD (hyperlipidemia)     Nephrolithiasis     Renal cyst       Past Surgical History:   Procedure Laterality Date    ABDOMEN SURGERY      Pneumothorax Spontaneous THORACOSCOPY    COMBINED CYSTOSCOPY, RETROGRADES, URETEROSCOPY, LASER HOLMIUM LITHOTRIPSY URETER(S), INSERT STENT Right 10/02/2024    Procedure: Cystoscopy, Right Ureteroscopy, Right retrograde Pyelogram, Right Holmium Laser Lithotripsy, Right Ureteral Stent Placement;  Surgeon: Troy Gorman MD;  Location: [facility] OR    COMBINED CYSTOSCOPY, RETROGRADES, URETEROSCOPY, LASER HOLMIUM LITHOTRIPSY URETER(S), INSERT STENT Right 10/14/2024    Procedure: Cystoscopy, Right Ureteroscopy, Right Retrograde Pyelogram, Right Laser Lithotripsy, Right Ureteral Stent Removal;  Surgeon: Efra Philip MD;  Location: [facility]    LITHOTRIPSY      ?    pneumothorax surgery      in     TONSILLECTOMY      age 7      Allergies   Allergen Reactions    Seasonal Allergies       Social History     Tobacco Use    Smoking status: Never    Smokeless tobacco: Never   Substance Use Topics    Alcohol use: Yes     Comment: occ      Wt Readings from Last 1 Encounters:   25 114.3 kg (252 lb)        Anesthesia Evaluation            ROS/MED HX  ENT/Pulmonary:  - neg pulmonary ROS     Neurologic:  - neg neurologic ROS     Cardiovascular:  - neg cardiovascular ROS     METS/Exercise Tolerance:     Hematologic:  - neg hematologic  ROS     Musculoskeletal:  - neg musculoskeletal ROS     GI/Hepatic:  - neg GI/hepatic ROS     Renal/Genitourinary:  - neg Renal ROS     Endo:  - neg endo ROS     Psychiatric/Substance Use:  - neg psychiatric ROS     Infectious Disease:  - neg infectious disease  "ROS     Malignancy:  - neg malignancy ROS     Other:  - neg other ROS          Physical Exam    Airway        Mallampati: II    Neck ROM: full     Respiratory Devices and Support         Dental       (+) Completely normal teeth      Cardiovascular   cardiovascular exam normal          Pulmonary   pulmonary exam normal                OUTSIDE LABS:  CBC:   Lab Results   Component Value Date    WBC 8.9 05/08/2023    WBC 15.4 (H) 11/27/2018    HGB 16.0 05/08/2023    HGB 15.6 11/27/2018    HCT 48.4 05/08/2023    HCT 46.9 11/27/2018     05/08/2023     11/27/2018     BMP:   Lab Results   Component Value Date     05/08/2023     11/29/2021    POTASSIUM 4.4 05/08/2023    POTASSIUM 4.0 11/29/2021    CHLORIDE 103 05/08/2023    CHLORIDE 107 11/29/2021    CO2 29 05/08/2023    CO2 28 11/29/2021    BUN 13.0 05/08/2023    BUN 14 11/29/2021    CR 0.99 05/08/2023    CR 0.84 11/29/2021    GLC 99 05/08/2023    GLC 98 11/29/2021     COAGS: No results found for: \"PTT\", \"INR\", \"FIBR\"  POC: No results found for: \"BGM\", \"HCG\", \"HCGS\"  HEPATIC:   Lab Results   Component Value Date    ALBUMIN 3.9 11/27/2018    PROTTOTAL 7.5 11/27/2018    ALT 60 11/27/2018    AST 29 11/27/2018    ALKPHOS 72 11/27/2018    BILITOTAL 0.5 11/27/2018     OTHER:   Lab Results   Component Value Date    A1C 5.6 06/11/2024    JOHNIE 9.3 05/08/2023    TSH 0.55 05/08/2023       Anesthesia Plan    ASA Status:  1    NPO Status:  NPO Appropriate    Anesthesia Type: MAC.   Induction: Intravenous, Propofol.   Maintenance: TIVA.        Consents    Anesthesia Plan(s) and associated risks, benefits, and realistic alternatives discussed. Questions answered and patient/representative(s) expressed understanding.     - Discussed: Risks, Benefits and Alternatives for BOTH SEDATION and the PROCEDURE were discussed     - Discussed with:  Patient            Postoperative Care    Pain management: IV analgesics, Oral pain medications.        Comments:               " "Bryan Georges MD    I have reviewed the pertinent notes and labs in the chart from the past 30 days and (re)examined the patient.  Any updates or changes from those notes are reflected in this note.    Clinically Significant Risk Factors Present on Admission                             # Obesity: Estimated body mass index is 30.47 kg/m  as calculated from the following:    Height as of 1/21/25: 1.937 m (6' 4.25\").    Weight as of this encounter: 114.3 kg (252 lb).                "

## 2025-02-11 NOTE — ANESTHESIA CARE TRANSFER NOTE
Patient: Dmitriy Saul    Procedure: Procedure(s):  LITHOTRIPSY, EXTRACORPOREAL SHOCK WAVE       Diagnosis: Nephrolithiasis [N20.0]  Diagnosis Additional Information: No value filed.    Anesthesia Type:   General     Note:    Oropharynx: oropharynx clear of all foreign objects  Level of Consciousness: awake  Oxygen Supplementation: room air    Independent Airway: airway patency satisfactory and stable  Dentition: dentition unchanged  Vital Signs Stable: post-procedure vital signs reviewed and stable  Report to RN Given: handoff report given  Patient transferred to: Phase II    Handoff Report: Identifed the Patient, Identified the Reponsible Provider, Reviewed the pertinent medical history, Discussed the surgical course, Reviewed Intra-OP anesthesia mangement and issues during anesthesia, Set expectations for post-procedure period and Allowed opportunity for questions and acknowledgement of understanding      Vitals:  Vitals Value Taken Time   /69 02/11/25 1345   Temp     Pulse 87    Resp 12    SpO2 97% on RA     Vitals shown include unfiled device data.    Electronically Signed By: TRINA Parker CRNA  February 11, 2025  1:46 PM

## 2025-02-11 NOTE — BRIEF OP NOTE
[facility] Brief Operative Note    Pre-operative diagnosis: Nephrolithiasis [N20.0]   Post-operative diagnosis * No post-op diagnosis entered *   Procedure: Procedure(s):  LITHOTRIPSY, EXTRACORPOREAL SHOCK WAVE   Surgeon: Troy Gorman MD   Assistants(s): none   Estimated blood loss: None    Specimens: None   Findings: Right lower pole renal stone fragmented well with ESWL, 2000 shocks,

## 2025-02-11 NOTE — ADDENDUM NOTE
Addendum  created 02/11/25 1622 by Bryan Georges MD    Attestation recorded in Intraprocedure, Intraprocedure Attestations filed

## 2025-02-11 NOTE — INTERVAL H&P NOTE
No interval changes  Plan for R ESWL for residual 4 mm stone in lower calyx  I think this was in steep lower calyx unable to clear with URS and he needs to be clear for being   Will try ESWL to see if it can do it  Understands risk of procedure, bleeding, hematoma, inability to fragment stone    Troy Gorman MD

## 2025-03-25 ENCOUNTER — ANCILLARY PROCEDURE (OUTPATIENT)
Dept: GENERAL RADIOLOGY | Facility: CLINIC | Age: 50
End: 2025-03-25
Attending: STUDENT IN AN ORGANIZED HEALTH CARE EDUCATION/TRAINING PROGRAM
Payer: COMMERCIAL

## 2025-03-25 DIAGNOSIS — N20.0 NEPHROLITHIASIS: ICD-10-CM

## 2025-03-25 PROCEDURE — 74018 RADEX ABDOMEN 1 VIEW: CPT | Mod: TC | Performed by: RADIOLOGY

## 2025-04-01 ENCOUNTER — TELEPHONE (OUTPATIENT)
Dept: UROLOGY | Facility: CLINIC | Age: 50
End: 2025-04-01
Payer: COMMERCIAL

## 2025-04-01 NOTE — TELEPHONE ENCOUNTER
Message left for patient to call back to schedule a vv on 4/3 with Dr Gorman. Will also send a V[employer]rema message.  Petra Kovacs RN

## 2025-04-03 ENCOUNTER — VIRTUAL VISIT (OUTPATIENT)
Dept: UROLOGY | Facility: CLINIC | Age: 50
End: 2025-04-03
Payer: COMMERCIAL

## 2025-04-03 DIAGNOSIS — N20.0 NEPHROLITHIASIS: Primary | ICD-10-CM

## 2025-04-03 PROCEDURE — 98005 SYNCH AUDIO-VIDEO EST LOW 20: CPT | Performed by: STUDENT IN AN ORGANIZED HEALTH CARE EDUCATION/TRAINING PROGRAM

## 2025-04-03 NOTE — NURSING NOTE
Current patient location: [place]    Is the patient currently in the state of [place]? YES    Visit mode: VIDEO    If the visit is dropped, the patient can be reconnected by:VIDEO VISIT: Send to e-mail at: [email or web address]    Will anyone else be joining the visit? NO  (If patient encounters technical issues they should call [phone]    Are changes needed to the allergy or medication list? E-check in was reviewed/completed for today's visit. VF did not review e-check in information again with Dmitriy due to this.       Are refills needed on medications prescribed by this physician? Discuss with provider    Rooming Documentation:  Unable to complete questionnaire(s) due to time    Reason for visit: RECHECK    Chloe FIGUEROA

## 2025-04-03 NOTE — PROGRESS NOTES
Joined the call at 4/3/2025, 10:08:39 am.  Left the call at 4/3/2025, 10:50:05 am.  You were on the call for 41 minutes 26 seconds .    Write letter     pieces.  I went over the findings with the patient and showed images. I think the stones just In a location where they are unlikely to pass, unable to reach ureteroscopically either.        Medical Comorbidities      Past Medical History:   Diagnosis Date    Calculus of kidney     Colon adenoma     Dysplastic nevus     HLD (hyperlipidemia)     Nephrolithiasis     Renal cyst                Medications     Current Outpatient Medications   Medication Sig Dispense Refill    cetirizine (ZYRTEC) 10 MG tablet Take 10 mg by mouth daily as needed for allergies.      olopatadine (PATANOL) 0.1 % ophthalmic solution 1 drop 2 times daily.       Current Facility-Administered Medications   Medication Dose Route Frequency Provider Last Rate Last Admin    candida albicans skin test injection 0.3 mL  0.3 mL Intradermal Once         candida albicans skin test injection 0.3 mL  0.3 mL Intradermal Once         candida albicans skin test injection 0.3 mL  0.3 mL Intradermal Once                 Assessment/Plan   Dmitriy Saul is a very pleasant AGE: 49 year old year old person  He had lithotripsy in 2006.  Shockwave lithotripsy  He has a history of calcium oxalate stones  He does have a  license    Nephrolithiasis - we did R URS and cleared most of the stone in his R kidney, there is residual 4 mm stone in the lower calyx that unable to reach URS even with secondary look likely a difficult angle calyx. We performed ESWL and the stone fragmented but haven't moved out of that spot. I discussed at this point he is asymptomatic and I would observe the stones, really no easy way to treat unless we potentially do a percutaneous approach and that's quite high morbidity for asymptomatic stone. With regards to his  license     I discussed and reviewed the CACI for retained kidney stones from [employer]  Pt is asymptomatic  No increase in the size or number of stone on the KUBs since 12/2024 and since the CT in 8/2024 the stone burden is  dramatically better.   Since surgery pt has completely recovered and is off pain medications and full release from me.  No history of complications or recurrent procedures (3 or more in the last 5 years); renal failure or obstruction; sepsis; or recurrent UTIs  due to stones  No underlying cause for the stone    We are planning for surveillance with KUB in 6 months  Continue hydration for stone prevention    CC:  Greg Suárez    Additional Coding Information:  Problems:  3 -- one stable chronic illness    Data Reviewed  Independent interpretation of a test performed by another physician/other qualified health care professional (not separately reported) KUB     Level of risk:  2 -- Minimal risk of morbidity

## 2025-04-03 NOTE — PROGRESS NOTES
"Virtual Visit Details    Type of service:  Video Visit     Originating Location (pt. Location): {video visit patient location:447761::\"Home\"}  {PROVIDER LOCATION On-site should be selected for visits conducted from your clinic location or adjoining [facility] hospital, academic office, or other nearby [facility] building. Off-site should be selected for all other provider locations, including home:866101}  Distant Location (provider location):  {virtual location provider:472104}  Platform used for Video Visit: {Virtual Visit Platforms:506411::\"[employer]\"}  "

## 2025-04-03 NOTE — LETTER
2025    Dmitriy Saul   1975        To Whom it May Concern;    I am writing this letter to provide a current status report on my patient, Dmitriy Saul, regarding his recent episode of kidney stones.     At present, Mr. Saul is asymptomatic. He has undergone repeat imaging in 3/25/2025, which confirms that there has been no increase in the number or size of his  the kidney stone in the right kidney which we have been observing.  Based on his current condition, he is unlikely to experience a sudden incapacitating event related to his kidney stones.     Following his recent surgical intervention, Mr. Saul has made a full recovery and has been fully released by me. He is no longer taking pain medication and has no history of complications such as hydronephrosis, metabolic or underlying conditions. Over the past five years, he has undergone fewer than three procedures related to kidney stones. Importantly, he has no history of renal failure, obstruction, sepsis, or recurrent urinary tract infections (UTIs) due to kidney stones.     There is no identified underlying cause for stone recurrence. His current treatment plan is limited to supportive measures, including hydration. We will be continuing to monitor his retained kidney stone in the right kidney. Again I think this is in a location that is unlikely to cause a sudden incapacitating event related to the stone.     Please feel free to contact my office if further information is needed.    Sincerely,  Troy Gorman MD

## 2025-04-22 ENCOUNTER — MYC MEDICAL ADVICE (OUTPATIENT)
Dept: UROLOGY | Facility: CLINIC | Age: 50
End: 2025-04-22
Payer: COMMERCIAL

## 2025-04-29 ENCOUNTER — TELEPHONE (OUTPATIENT)
Dept: INTERNAL MEDICINE | Facility: CLINIC | Age: 50
End: 2025-04-29
Payer: COMMERCIAL

## 2025-04-29 NOTE — TELEPHONE ENCOUNTER
Called and spoke with patient and collectively decided to schedule office visit for est care first, and then schedule the physical later.     Pt thanked for call.

## 2025-04-29 NOTE — TELEPHONE ENCOUNTER
Reason for Call:  Appointment Request    Patient requesting this type of appt:  Preventive     Requested provider:  Jing Kim MD     Reason patient unable to be scheduled: Not within requested timeframe    When does patient want to be seen/preferred time: after 06.11.2024 (last [facility]- 06.11.2024)    Comments: Pt would like an annual px appt w/ Dr. Kim ( Saul in [place] is pt's spouse) recommended Dr. Kim- however; soonest isn't til Oct. Pls call and offer a sooner appt after June 2025. Thanks.     Could we send this information to you in [employer] or would you prefer to receive a phone call?:   Patient would prefer a phone call   Okay to leave a detailed message?: Yes at Cell number on file:    Telephone Information:   Mobile [phone]       Call taken on 4/29/2025 at 3:47 PM by Edwige Ogden

## 2025-05-12 ENCOUNTER — PATIENT OUTREACH (OUTPATIENT)
Dept: CARE COORDINATION | Facility: CLINIC | Age: 50
End: 2025-05-12
Payer: COMMERCIAL

## 2025-05-26 ENCOUNTER — PATIENT OUTREACH (OUTPATIENT)
Dept: CARE COORDINATION | Facility: CLINIC | Age: 50
End: 2025-05-26
Payer: COMMERCIAL

## 2025-05-28 NOTE — PROGRESS NOTES
[facility] Dermatology Note    Encounter Date: Jun 4, 2025    Dermatology Problem List:  1. Fibrous papule, R nasal ala, s/p shave bx 4/26/24  - s/p cryo 11/06/2024 to recurrent lesion  2. Verrucous keratosis, L nasal tip s/p shave bx 8/16/24  - LN2 4/26/24, 5/29/24, 7/10/24, 8/16/24, 11/06/2024  3. Warts, L plantar foot resolved s/p candida + cryo  5. Acne, R nasal tip   - current tx: clindamycin   ______________________________________    Impression/Plan:     # inflamed skin tags, infraorbital cheeks x 5  - Cryotherapy performed today. See procedure note below.     # inflamed/symptomatic Verrucous keratosis, L nasal tip.   -  s/p shave bx 8/16/24, s/p cryo 11/06/2024 to recurrent lesion  - recurrent lesion minimally present on exam today.      # Verruca plantaris, L plantar foot.  - Resolved on exam today.    - advised could still consider HPV vaccine out of pocket    # Acne vulgaris, R nasal tip   - start clindamycin lotion BID prn   - advised sal acid or BP wash, FDA recall for BP wash discussed    # Benign lesions - SKs, cherry angiomas, lentigenes.  - No treatment required    # Multiple benign nevi   - Monitor for ABCDEs of melanoma   - Continue sun protection - recommend SPF 30 or higher with frequent application   - Return sooner if noticing changing or symptomatic lesions     Procedures Performed:   - Cryotherapy procedure note: After verbal consent and discussion of risks and benefits including but no limited to dyspigmentation/scar, blister, and pain, 5 skin tags was(were) treated with 1-2mm freeze border for 2 cycles with liquid nitrogen. Post cryotherapy instructions were provided.     Follow-up 1 year for [facility] or sooner for new or changing lesions.     Staff and Scribe:     Scribe Disclosure:   Steff VELÁZQUEZ, am serving as a scribe; to document services personally performed by Shama Zimmerman MD -based on data collection and the provider's statements to me.     Provider  Disclosure:   The documentation recorded by the scribe accurately reflects the services I personally performed and the decisions made by me.    Shama Zimmerman MD    Department of Dermatology  [facility]: Phone: [phone], Fax: [phone]  6/4/2025             ______________________________________    CC:   Chief Complaint   Patient presents with    Derm Problem     Here today for a skin check. Spot of concern on nose. Warts on feet.. skin tags around eyes.        History of Present Illness:  Mr. Dmitriy Saul is a 50 year old male who presents as a return patient for [facility].     Spot of concern on his nose today, this area is somewhat new. Occasionally experiences breakouts in this area.   Has a few skin tags that are bothersome, these are located around his eyes.   Thinks his warts on the feet may be gone now, would like these checked to ensure resolution.   Has not gotten HPV vaccine.     Physical exam:  Vitals: There were no vitals taken for this visit.  GEN: This is a well developed, well-nourished male in no acute distress, in a pleasant mood.    SKIN: Total skin excluding the undergarment areas was performed. The exam included the head/face, neck, both arms, chest, back, abdomen, both legs, digits and/or nails.   - inflammatory papule, R nasal tip   - There is(are) skin colored pedunculated papules on the infraorbital cheeks.   - Multiple regular brown pigmented macules and papules are identified on the trunk and extremities.   - Scattered brown macules on sun exposed areas.  - There are dome shaped bright red papules on the trunk and extremities.   - Waxy stuck on papules and plaques on trunk and extremities.   - No other lesions of concern on areas examined.     Past Medical History:   Past Medical History:   Diagnosis Date    Calculus of kidney     Colon adenoma     Dysplastic nevus     HLD (hyperlipidemia)      Nephrolithiasis     Renal cyst      Past Surgical History:   Procedure Laterality Date    ABDOMEN SURGERY  1995    Pneumothorax Spontaneous THORACOSCOPY    COMBINED CYSTOSCOPY, RETROGRADES, URETEROSCOPY, LASER HOLMIUM LITHOTRIPSY URETER(S), INSERT STENT Right 10/02/2024    Procedure: Cystoscopy, Right Ureteroscopy, Right retrograde Pyelogram, Right Holmium Laser Lithotripsy, Right Ureteral Stent Placement;  Surgeon: Troy Gorman MD;  Location: [facility] OR    COMBINED CYSTOSCOPY, RETROGRADES, URETEROSCOPY, LASER HOLMIUM LITHOTRIPSY URETER(S), INSERT STENT Right 10/14/2024    Procedure: Cystoscopy, Right Ureteroscopy, Right Retrograde Pyelogram, Right Laser Lithotripsy, Right Ureteral Stent Removal;  Surgeon: Efra Philip MD;  Location: [facility] OR    EXTRACORPOREAL SHOCK WAVE LITHOTRIPSY (ESWL) Right 2/11/2025    Procedure: RIGHT extracorporeal shockwave lithotripsy;  Surgeon: Troy Gorman MD;  Location: [facility] OR    LITHOTRIPSY      2007?    pneumothorax surgery      in 1996    TONSILLECTOMY      age 7       Social History:   reports that he has never smoked. He has never used smokeless tobacco. He reports current alcohol use. He reports that he does not use drugs.    Family History:  Family History   Problem Relation Age of Onset    Breast Cancer Mother 69    Heart block Father     Hyperlipidemia Father     Pancreatic Cancer Maternal Grandmother 77    Prostate Cancer Maternal Grandfather     Skin Cancer Maternal Grandfather     Crohn's Disease Brother     Galactosemia Brother     Other - See Comments Brother     Genetic Disorder Brother         Crohn's Disease and galactosemia    Anesthesia Reaction No family hx of     Malignant Hyperthermia No family hx of        Medications:  Current Outpatient Medications   Medication Sig Dispense Refill    cetirizine (ZYRTEC) 10 MG tablet Take 10 mg by mouth daily as needed for allergies.      olopatadine (PATANOL) 0.1 % ophthalmic solution 1  drop 2 times daily.       Allergies   Allergen Reactions    Seasonal Allergies

## 2025-06-04 ENCOUNTER — OFFICE VISIT (OUTPATIENT)
Dept: DERMATOLOGY | Facility: CLINIC | Age: 50
End: 2025-06-04
Attending: DERMATOLOGY
Payer: COMMERCIAL

## 2025-06-04 DIAGNOSIS — D18.01 CHERRY ANGIOMA: ICD-10-CM

## 2025-06-04 DIAGNOSIS — L82.1 SEBORRHEIC KERATOSES: ICD-10-CM

## 2025-06-04 DIAGNOSIS — L91.8 INFLAMED SKIN TAG: ICD-10-CM

## 2025-06-04 DIAGNOSIS — L81.4 SOLAR LENTIGO: ICD-10-CM

## 2025-06-04 DIAGNOSIS — Z12.83 SKIN CANCER SCREENING: Primary | ICD-10-CM

## 2025-06-04 DIAGNOSIS — L70.0 ACNE VULGARIS: ICD-10-CM

## 2025-06-04 DIAGNOSIS — D22.9 MULTIPLE BENIGN NEVI: ICD-10-CM

## 2025-06-04 DIAGNOSIS — R23.8 INFLAMMATORY PAPULE: ICD-10-CM

## 2025-06-04 RX ORDER — CLINDAMYCIN PHOSPHATE 10 UG/ML
LOTION TOPICAL 2 TIMES DAILY
Qty: 60 ML | Refills: 11 | Status: SHIPPED | OUTPATIENT
Start: 2025-06-04

## 2025-06-04 ASSESSMENT — PAIN SCALES - GENERAL: PAINLEVEL_OUTOF10: NO PAIN (0)

## 2025-06-04 NOTE — LETTER
6/4/2025       RE: Dmitriy Saul  [address] [address] [facility] [address]     Dear Colleague,    Thank you for referring your patient, Dmitriy Saul, to the [facility] [place] at [facility]. Please see a copy of my visit note below.    [facility] Dermatology Note    Encounter Date: Jun 4, 2025    Dermatology Problem List:  1. Fibrous papule, R nasal ala, s/p shave bx 4/26/24  - s/p cryo 11/06/2024 to recurrent lesion  2. Verrucous keratosis, L nasal tip s/p shave bx 8/16/24  - LN2 4/26/24, 5/29/24, 7/10/24, 8/16/24, 11/06/2024  3. Warts, L plantar foot resolved s/p candida + cryo  5. Acne, R nasal tip   - current tx: clindamycin   ______________________________________    Impression/Plan:     # inflamed skin tags, infraorbital cheeks x 5  - Cryotherapy performed today. See procedure note below.     # inflamed/symptomatic Verrucous keratosis, L nasal tip.   -  s/p shave bx 8/16/24, s/p cryo 11/06/2024 to recurrent lesion  - recurrent lesion minimally present on exam today.      # Verruca plantaris, L plantar foot.  - Resolved on exam today.    - advised could still consider HPV vaccine out of pocket    # Acne vulgaris, R nasal tip   - start clindamycin lotion BID prn   - advised sal acid or BP wash, FDA recall for BP wash discussed    # Benign lesions - SKs, cherry angiomas, lentigenes.  - No treatment required    # Multiple benign nevi   - Monitor for ABCDEs of melanoma   - Continue sun protection - recommend SPF 30 or higher with frequent application   - Return sooner if noticing changing or symptomatic lesions     Procedures Performed:   - Cryotherapy procedure note: After verbal consent and discussion of risks and benefits including but no limited to dyspigmentation/scar, blister, and pain, 5 skin tags was(were) treated with 1-2mm freeze border for 2 cycles with liquid nitrogen. Post cryotherapy instructions  were provided.     Follow-up 1 year for FBSC or sooner for new or changing lesions.     Staff and Scribe:     Scribe Disclosure:   I, Steff Shanks, am serving as a scribe; to document services personally performed by Shama Zimmerman MD -based on data collection and the provider's statements to me.     Provider Disclosure:   The documentation recorded by the scribe accurately reflects the services I personally performed and the decisions made by me.    Shama Zimmerman MD    Department of Dermatology  [facility]: Phone: [phone], Fax: [phone]  6/4/2025             ______________________________________    CC:   Chief Complaint   Patient presents with     Derm Problem     Here today for a skin check. Spot of concern on nose. Warts on feet.. skin tags around eyes.        History of Present Illness:  Mr. Dmitriy Saul is a 50 year old male who presents as a return patient for [facility].     Spot of concern on his nose today, this area is somewhat new. Occasionally experiences breakouts in this area.   Has a few skin tags that are bothersome, these are located around his eyes.   Thinks his warts on the feet may be gone now, would like these checked to ensure resolution.   Has not gotten HPV vaccine.     Physical exam:  Vitals: There were no vitals taken for this visit.  GEN: This is a well developed, well-nourished male in no acute distress, in a pleasant mood.    SKIN: Total skin excluding the undergarment areas was performed. The exam included the head/face, neck, both arms, chest, back, abdomen, both legs, digits and/or nails.   - inflammatory papule, R nasal tip   - There is(are) skin colored pedunculated papules on the infraorbital cheeks.   - Multiple regular brown pigmented macules and papules are identified on the trunk and extremities.   - Scattered brown macules on sun exposed areas.  - There are dome shaped  bright red papules on the trunk and extremities.   - Waxy stuck on papules and plaques on trunk and extremities.   - No other lesions of concern on areas examined.     Past Medical History:   Past Medical History:   Diagnosis Date     Calculus of kidney      Colon adenoma      Dysplastic nevus      HLD (hyperlipidemia)      Nephrolithiasis      Renal cyst      Past Surgical History:   Procedure Laterality Date     ABDOMEN SURGERY  1995    Pneumothorax Spontaneous THORACOSCOPY     COMBINED CYSTOSCOPY, RETROGRADES, URETEROSCOPY, LASER HOLMIUM LITHOTRIPSY URETER(S), INSERT STENT Right 10/02/2024    Procedure: Cystoscopy, Right Ureteroscopy, Right retrograde Pyelogram, Right Holmium Laser Lithotripsy, Right Ureteral Stent Placement;  Surgeon: Troy Gorman MD;  Location: [facility] OR     COMBINED CYSTOSCOPY, RETROGRADES, URETEROSCOPY, LASER HOLMIUM LITHOTRIPSY URETER(S), INSERT STENT Right 10/14/2024    Procedure: Cystoscopy, Right Ureteroscopy, Right Retrograde Pyelogram, Right Laser Lithotripsy, Right Ureteral Stent Removal;  Surgeon: Efra Philip MD;  Location: [facility] OR     EXTRACORPOREAL SHOCK WAVE LITHOTRIPSY (ESWL) Right 2/11/2025    Procedure: RIGHT extracorporeal shockwave lithotripsy;  Surgeon: Troy Gorman MD;  Location: [facility] OR     LITHOTRIPSY      2007?     pneumothorax surgery      in 1996     TONSILLECTOMY      age 7       Social History:   reports that he has never smoked. He has never used smokeless tobacco. He reports current alcohol use. He reports that he does not use drugs.    Family History:  Family History   Problem Relation Age of Onset     Breast Cancer Mother 69     Heart block Father      Hyperlipidemia Father      Pancreatic Cancer Maternal Grandmother 77     Prostate Cancer Maternal Grandfather      Skin Cancer Maternal Grandfather      Crohn's Disease Brother      Galactosemia Brother      Other - See Comments Brother      Genetic Disorder Brother          Crohn's Disease and galactosemia     Anesthesia Reaction No family hx of      Malignant Hyperthermia No family hx of        Medications:  Current Outpatient Medications   Medication Sig Dispense Refill     cetirizine (ZYRTEC) 10 MG tablet Take 10 mg by mouth daily as needed for allergies.       olopatadine (PATANOL) 0.1 % ophthalmic solution 1 drop 2 times daily.       Allergies   Allergen Reactions     Seasonal Allergies        Again, thank you for allowing me to participate in the care of your patient.      Sincerely,    Shama Zimmerman MD

## 2025-06-04 NOTE — PATIENT INSTRUCTIONS
Can start salicylic acid wash in shower OR benzoyl peroxide wash (can bleach towels/clothing)  As wash in shower for maintenance    Twice daily when have a spot can use clindamycin lotion            Limited number of voluntary recalls initiated after FDA testing of acne products for benzene; findings show a small number of products with elevated levels of benzene contamination  [3/11/2025] FDA is alerting the public and industry to the results of new agency testing of 95 acne products containing benzoyl peroxide for possible benzene contamination. FDA has concluded that a limited number of products should be recalled at the retail level; more than 90% of tested products had undetectable or extremely low levels of benzene.    FDA initiated independent testing following receipt of third-party testing results submitted to the agency that raised concerns about elevated levels of benzene in certain acne products containing benzoyl peroxide. FDA testing results indicate fewer products with benzene contamination than the third-party findings.    The companies listed below voluntarily agreed to take action to recall certain products (listed below) due to elevated levels of benzene. It is important to note the recalls are being conducted at the retail level, not the consumer level. This means retailers are instructed to remove products from store shelves and online marketplaces but does not specifically instruct consumers to take actions regarding products currently in their possession. Even with daily use of these products for decades, the risk of a person developing cancer because of exposure to benzene found in these products is very low.    Product              Lot number(s)   Expiration date  La Roche-Posay Effaclar Duo Dual Action Acne Treatment   [id number]   April 2025  [facility] Acne Control Cleanser      [id number]   September 2025  Proactiv Emergency Blemish Relief Cream Benzoyl Peroxide 5%  [id number]; [id number]  October  2025  Proactiv Skin Smoothing Exfoliator      [id number]   July 2025  [facility] Benzoyl Peroxide Acne Lotion     [id number]   March 2025  [facility] Tinted Acne Treatment Cream     [id number]   March 2026      Additionally, the  of another benzoyl peroxide acne product, Zapzyt Acne Treatment Gel, agreed to voluntarily recall this product due to the elevated level of benzene found during its own testing.    Visit FDA s drug recalls page or search FDA s recalls database for additional information and updates on product recalls.    Benzene is a chemical formed in nature and from human activities. It is also a natural part of crude oil, gasoline and cigarette smoke. Exposure to benzene can increase the risk of cancer. Further information regarding benzene in drugs can be found here.    FDA s testing used validated test methods and assessed all benzoyl peroxide products identified by third-party laboratories as having elevated benzene levels that FDA was able to purchase at the time of testing, as well as additional marketed products. FDA tested a total of 95 acne treatment products containing benzoyl peroxide and found six products with elevated levels of benzene. FDA notes that some of these six products are approaching their expiration dates and recommends consumers check their products and throw away products that are beyond their expiration date.      FDA intends to publish the full results of its testing, including data and information on testing methods, in one or more peer-reviewed journals in the coming months.     FDA continues to underscore and remind manufacturers, distributors, repackagers and importers that they are responsible for the safety and quality of their products. FDA requires manufacturers to evaluate and test for possible contaminants in their products to ensure they meet specifications and are free from harmful contamination, including benzene.     FDA has continued to raise concern that use of  unvalidated testing methods by third-party laboratories can produce inaccurate results leading to consumer confusion. Specifically, such methods may result in much higher reported levels of contaminants such as benzene than are actually present in tested products. It is critical that third-party laboratories reporting their results to consumers use validated methods so their results are reliable.         Checking for Skin Cancer  You can help find cancer early by checking your skin each month. There are 3 main kinds of skin cancer: melanoma, basal cell carcinoma, and squamous cell carcinoma. Doing monthly skin checks is the best way to find new marks, sores, or skin changes. Follow these instructions for checking your skin.   The ABCDEs of checking moles for melanoma   Check your moles or growths for signs of melanoma using ABCDE:   Asymmetry: The sides of the mole or growth don t match.  Border: The edges are ragged, notched, or blurred.  Color: The color within the mole or growth varies. It could be black, brown, tan, white, or shades of red, gray, or blue.  Diameter: The mole or growth is larger than   inch or 6 mm (size of a pencil eraser).  Evolving: The size, shape, texture, or color of the mole or growth is changing.     ABCDE's of moles on light skin.        ABCDE's of moles on dark skin may be harder to identify.     Checking for other types of skin cancer  Basal cell carcinoma or squamous cell carcinoma cause symptoms like:     A spot or mole that looks different from all other marks on your skin  Changes in how an area feels, such as itching, tenderness, or pain  Changes in the skin's surface, such as oozing, bleeding, or scaliness  A sore that doesn't heal  New swelling, redness, or spread of color beyond the border of a mole    Who s at risk?  Anyone of any skin color can get skin cancer. But you're at greater risk if you have:   Fair skin that freckles easily and burns instead of tanning  Light-colored  or red hair  Light-colored eyes  Many moles or abnormal moles on your skin  A long history of unprotected exposure to sunlight or tanning beds  A history of many blistering sunburns as a child or teen  A family history of skin cancer  Been exposed to radiation or chemicals  A weakened immune system  Been exposed to arsenic  If you've had skin cancer in the past, you're at high risk of having it again.   How to check your skin  Do your monthly skin checkups in front of a full-length mirror. Use a room with good lighting so it's easier to see. Use a hand mirror to look at hard-to-see places like your buttocks and back. You can also have a trusted friend or family member help you with these checks. Check every part of your body, including your:   Head (ears, face, neck, and scalp)  Torso (front, back, sides, and under breasts)  Arms (tops, undersides, and armpits)  Hands (palms, backs, and fingers, including under the nails)  Lower back, buttocks, and genitals  Legs (front, back, and sides)  Feet (tops, soles, toes, including under the nails, and between toes)  Watch for new spots on your skin or a spot that's changing in color, shape, size.   If you have a lot of moles, take digital photos of them each month. Make sure to take photos both up close and from a distance. These can help you see if any moles change over time.   Know your skin  Most skin changes aren't cancer. But if you see any changes in your skin, call your healthcare provider right away. Only they can tell you if a change is a problem. If you have skin cancer, seeing your provider can be the first step to getting the treatment that could save your life.   [employer] last reviewed this educational content on 10/1/2021    5693-4924 [employer]. All rights reserved. This information is not intended as a substitute for professional medical care. Always follow your healthcare professional's instructions.

## 2025-06-04 NOTE — NURSING NOTE
Dermatology Rooming Note    Dmitriy Saul's goals for this visit include:   Chief Complaint   Patient presents with    Derm Problem     Here today for a skin check. Spot of concern on nose. Warts on feet.. skin tags around eyes.      Nimisha Byrnes RN

## 2025-06-13 PROBLEM — D35.02 ADENOMA OF LEFT ADRENAL GLAND: Status: ACTIVE | Noted: 2025-06-13

## 2025-06-13 PROBLEM — Z00.00 ROUTINE GENERAL MEDICAL EXAMINATION AT A HEALTH CARE FACILITY: Status: ACTIVE | Noted: 2025-06-13

## 2025-06-16 ENCOUNTER — RESULTS FOLLOW-UP (OUTPATIENT)
Dept: INTERNAL MEDICINE | Facility: CLINIC | Age: 50
End: 2025-06-16

## 2025-06-16 DIAGNOSIS — E78.2 MIXED HYPERLIPIDEMIA: Primary | ICD-10-CM

## 2025-06-16 RX ORDER — ROSUVASTATIN CALCIUM 10 MG/1
10 TABLET, COATED ORAL DAILY
Qty: 90 TABLET | Refills: 3 | Status: SHIPPED | OUTPATIENT
Start: 2025-06-16

## 2025-06-17 ENCOUNTER — PATIENT OUTREACH (OUTPATIENT)
Dept: CARE COORDINATION | Facility: CLINIC | Age: 50
End: 2025-06-17
Payer: COMMERCIAL

## 2025-06-20 ENCOUNTER — HOSPITAL ENCOUNTER (OUTPATIENT)
Dept: CT IMAGING | Facility: HOSPITAL | Age: 50
Discharge: HOME | End: 2025-06-20
Attending: INTERNAL MEDICINE | Admitting: INTERNAL MEDICINE
Payer: COMMERCIAL

## 2025-06-20 DIAGNOSIS — D35.02 ADENOMA OF LEFT ADRENAL GLAND: ICD-10-CM

## 2025-06-20 PROCEDURE — 74170 CT ABD WO CNTRST FLWD CNTRST: CPT

## 2025-06-20 PROCEDURE — 250N000011 HC RX IP 250 OP 636: Performed by: INTERNAL MEDICINE

## 2025-06-20 RX ORDER — IOPAMIDOL 755 MG/ML
90 INJECTION, SOLUTION INTRAVASCULAR ONCE
Status: COMPLETED | OUTPATIENT
Start: 2025-06-20 | End: 2025-06-20

## 2025-06-20 RX ADMIN — IOPAMIDOL 90 ML: 755 INJECTION, SOLUTION INTRAVENOUS at 16:08

## 2025-06-23 ENCOUNTER — RESULTS FOLLOW-UP (OUTPATIENT)
Dept: INTERNAL MEDICINE | Facility: CLINIC | Age: 50
End: 2025-06-23

## 2025-07-15 ENCOUNTER — HOSPITAL ENCOUNTER (OUTPATIENT)
Dept: CT IMAGING | Facility: CLINIC | Age: 50
Discharge: HOME | End: 2025-07-15
Attending: INTERNAL MEDICINE
Payer: COMMERCIAL

## 2025-07-15 DIAGNOSIS — E78.2 MIXED HYPERLIPIDEMIA: ICD-10-CM

## 2025-07-15 PROCEDURE — 75571 CT HRT W/O DYE W/CA TEST: CPT

## 2025-07-16 ENCOUNTER — RESULTS FOLLOW-UP (OUTPATIENT)
Dept: INTERNAL MEDICINE | Facility: CLINIC | Age: 50
End: 2025-07-16
Payer: COMMERCIAL

## 2025-07-23 ENCOUNTER — OFFICE VISIT (OUTPATIENT)
Dept: CARDIOLOGY | Facility: CLINIC | Age: 50
End: 2025-07-23
Attending: INTERNAL MEDICINE
Payer: COMMERCIAL

## 2025-07-23 VITALS
WEIGHT: 249 LBS | RESPIRATION RATE: 16 BRPM | BODY MASS INDEX: 30.01 KG/M2 | DIASTOLIC BLOOD PRESSURE: 73 MMHG | HEART RATE: 60 BPM | SYSTOLIC BLOOD PRESSURE: 120 MMHG

## 2025-07-23 DIAGNOSIS — E78.2 MIXED HYPERLIPIDEMIA: Primary | ICD-10-CM

## 2025-07-23 DIAGNOSIS — E78.41 ELEVATED LIPOPROTEIN(A): ICD-10-CM

## 2025-07-23 DIAGNOSIS — Z82.49 FAMILY HISTORY OF ISCHEMIC HEART DISEASE: ICD-10-CM

## 2025-07-23 PROCEDURE — 3074F SYST BP LT 130 MM HG: CPT | Performed by: STUDENT IN AN ORGANIZED HEALTH CARE EDUCATION/TRAINING PROGRAM

## 2025-07-23 PROCEDURE — G2211 COMPLEX E/M VISIT ADD ON: HCPCS | Performed by: STUDENT IN AN ORGANIZED HEALTH CARE EDUCATION/TRAINING PROGRAM

## 2025-07-23 PROCEDURE — 3078F DIAST BP <80 MM HG: CPT | Performed by: STUDENT IN AN ORGANIZED HEALTH CARE EDUCATION/TRAINING PROGRAM

## 2025-07-23 PROCEDURE — 99203 OFFICE O/P NEW LOW 30 MIN: CPT | Performed by: STUDENT IN AN ORGANIZED HEALTH CARE EDUCATION/TRAINING PROGRAM

## 2025-07-23 NOTE — PROGRESS NOTES
[facility] HEART CARE   [address]  [place] [address]   [email or web address]   OFFICE: [phone]     CARDIOLOGY CLINIC NOTE     Thank you, Dr. Kim, [facility], for asking the [facility] Heart Care team to see Mr. Dmitriy Saul to evaluate New Patient           History of Present Illness   Mr. Dmitriy Saul is a 50 year old male with a significant past history of HLD, Elevated Lp(a), family history of CAD, who presents for evaluation of cardiac risk.  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  He denies any exertional symptoms.  He notes his father had a stent placed in his 60s.  He eats out frequently due to his work.  He eats a lot of steak and burgers.  He recently started rosuvastatin.  The first week he had some thigh cramps but this improved over time.           Medications  Allergies   Current Outpatient Medications   Medication Sig Dispense Refill    cetirizine (ZYRTEC) 10 MG tablet Take 10 mg by mouth daily as needed for allergies.      dexAMETHasone (DECADRON) 1 MG tablet Take 1 tablet (1 mg) by mouth once for 1 dose. Take midnight before 8 AM draw the next day. 1 tablet 0    olopatadine (PATANOL) 0.1 % ophthalmic solution 1 drop 2 times daily.      rosuvastatin (CRESTOR) 10 MG tablet Take 1 tablet (10 mg) by mouth daily. 90 tablet 3    clindamycin (CLEOCIN T) 1 % external lotion Apply topically 2 times daily. To face as needed (Patient not taking: Reported on 7/23/2025) 60 mL 11      Allergies   Allergen Reactions    Seasonal Allergies         Physical Examination Review of Systems   Vitals: /73 (BP Location: Right arm, Patient Position: Sitting, Cuff Size: Adult Large)   Pulse 60   Resp 16   Wt 112.9 kg (249 lb)   BMI 30.01 kg/m    BMI= Body mass index is 30.01 kg/m .  Wt Readings from Last 3 Encounters:   07/23/25  112.9 kg (249 lb)   06/13/25 112.5 kg (248 lb)   02/11/25 114.3 kg (252 lb)       General: pleasant male. No acute distress.   Neck: No JVD  Lungs: clear to auscultation  COR:  regular rate and rhythm, No murmurs, rubs, or gallops  Extrem: No edema        Please refer above for cardiac ROS details.       Past History     Family History:   Family History   Problem Relation Age of Onset    Breast Cancer Mother 69    Heart block Father     Hyperlipidemia Father     Pancreatic Cancer Maternal Grandmother 77    Prostate Cancer Maternal Grandfather     Skin Cancer Maternal Grandfather     Crohn's Disease Brother     Galactosemia Brother     Other - See Comments Brother     Genetic Disorder Brother         Crohn's Disease and galactosemia    Anesthesia Reaction No family hx of     Malignant Hyperthermia No family hx of         Social History:   Social History     Socioeconomic History    Marital status:      Spouse name: Not on file    Number of children: Not on file    Years of education: Not on file    Highest education level: Not on file   Occupational History    Not on file   Tobacco Use    Smoking status: Never    Smokeless tobacco: Never   Vaping Use    Vaping status: Never Used   Substance and Sexual Activity    Alcohol use: Yes     Comment: occ    Drug use: No    Sexual activity: Yes     Partners: Female   Other Topics Concern    Parent/sibling w/ CABG, MI or angioplasty before 65F 55M? Yes     Comment: STENT   Social History Narrative    Works full time at [employer].      Social Drivers of Health     Financial Resource Strain: Low Risk  (6/13/2025)    Financial Resource Strain     Within the past 12 months, have you or your family members you live with been unable to get utilities (heat, electricity) when it was really needed?: No   Food Insecurity: Low Risk  (6/13/2025)    Food Insecurity     Within the past 12 months, did you worry that your food would run out before you got money to buy more?: No     Within  the past 12 months, did the food you bought just not last and you didn t have money to get more?: No   Transportation Needs: Low Risk  (6/13/2025)    Transportation Needs     Within the past 12 months, has lack of transportation kept you from medical appointments, getting your medicines, non-medical meetings or appointments, work, or from getting things that you need?: No   Physical Activity: Sufficiently Active (6/13/2025)    Exercise Vital Sign     Days of Exercise per Week: 5 days     Minutes of Exercise per Session: 60 min   Stress: No Stress Concern Present (6/13/2025)    [employer] [place] [employer] - Occupational Stress Questionnaire     Feeling of Stress : Not at all   Social Connections: Unknown (6/13/2025)    Social Connection and Isolation Panel [NHANES]     Frequency of Communication with Friends and Family: Not on file     Frequency of Social Gatherings with Friends and Family: Twice a week     Attends Holiness Services: Not on file     Active Member of Clubs or Organizations: Not on file     Attends Club or Organization Meetings: Not on file     Marital Status: Not on file   Interpersonal Safety: Low Risk  (2/11/2025)    Interpersonal Safety     Do you feel physically and emotionally safe where you currently live?: Yes     Within the past 12 months, have you been hit, slapped, kicked or otherwise physically hurt by someone?: No     Within the past 12 months, have you been humiliated or emotionally abused in other ways by your partner or ex-partner?: No   Housing Stability: Low Risk  (6/13/2025)    Housing Stability     Do you have housing? : Yes     Are you worried about losing your housing?: No            Lab Results    Chemistry/lipid CBC Cardiac Enzymes/BNP/TSH/INR   Lab Results   Component Value Date    CHOL 277 (H) 06/13/2025    HDL 35 (L) 06/13/2025    TRIG 233 (H) 06/13/2025    BUN 15.2 06/13/2025     06/13/2025    CO2 25 06/13/2025    Lab Results   Component Value Date    WBC  11.4 (H) 06/13/2025    HGB 15.6 06/13/2025    HCT 46.8 06/13/2025    MCV 85 06/13/2025     06/13/2025    Lab Results   Component Value Date    TSH 0.65 06/13/2025          Cardiac Problems and Cardiac Diagnostics     Most Recent Cardiac testing:  Cardiac CAC 7/15/2025:  IMPRESSIONS:  1.  No coronary calcifications.  2.  The total Agatston calcium score is 0 placing the patient in the  lowest percentile when compared to age and gender matched control  group.  3.  Recommend aggressive risk factor modification.  4.  Please review the separate Radiology report for incidental  noncardiac findings.          Assessment/Recommendations   Assessment:    Mr. Dmitriy Saul is a 50 year old male with a significant past history of HLD, Elevated Lp(a), family history of CAD, who presents for evaluation of cardiac risk.      Cardiac risk   - CAC 0 which predicts low 15 year event rate, however with suboptimal lipid panel and elevated LP(a) agree with statin therapy   - Work on dietary changes, cutting down on restaurant food and red meat in particular   - Continue exercise with cycling   - Consider adding dedicated resistance training   - FLP is planned next month.  Will follow up on this.  Would aim for LDL goal <100.     - If increasing statin would add CoQ 10    RTC 12 months    The longitudinal plan of care for the diagnosis(es)/condition(s) as documented were addressed during this visit. Due to the added complexity in care, I will continue to support Dmitriy in the subsequent management and with ongoing continuity of care.           Kaushik Bojorquez DO [facility]  Non-invasive Cardiologist  [facility]

## 2025-07-23 NOTE — PATIENT INSTRUCTIONS
It was a pleasure meeting you today    In summary:    We will follow up on the cholesterol numbers.      Please call my nurse Horacio Aragon at [phone] if you have any questions or issues.    We will schedule a follow up visit in  12 months      Kaushik Bojorquez DO [facility]  Non-invasive Cardiologist  [facility]

## 2025-07-23 NOTE — LETTER
7/23/2025    Jing Kim MD  [address] [address]    RE: Dmitriy Saul       Dear Colleague,     I had the pleasure of seeing Dmitriy Saul in the [facility].    [facility] HEART CARE   [address]  [place] [address]   [email or web address]   OFFICE: [phone]     CARDIOLOGY CLINIC NOTE     Thank you, Dr. Kim, [facility], for asking the [facility] Heart Care team to see Mr. Dmitriy Saul to evaluate New Patient           History of Present Illness   Mr. Dmitriy Saul is a 50 year old male with a significant past history of HLD, Elevated Lp(a), family history of CAD, who presents for evaluation of cardiac risk.  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  He denies any exertional symptoms.  He notes his father had a stent placed in his 60s.  He eats out frequently due to his work.  He eats a lot of steak and burgers.  He recently started rosuvastatin.  The first week he had some thigh cramps but this improved over time.           Medications  Allergies   Current Outpatient Medications   Medication Sig Dispense Refill     cetirizine (ZYRTEC) 10 MG tablet Take 10 mg by mouth daily as needed for allergies.       dexAMETHasone (DECADRON) 1 MG tablet Take 1 tablet (1 mg) by mouth once for 1 dose. Take midnight before 8 AM draw the next day. 1 tablet 0     olopatadine (PATANOL) 0.1 % ophthalmic solution 1 drop 2 times daily.       rosuvastatin (CRESTOR) 10 MG tablet Take 1 tablet (10 mg) by mouth daily. 90 tablet 3     clindamycin (CLEOCIN T) 1 % external lotion Apply topically 2 times daily. To face as needed (Patient not taking: Reported on 7/23/2025) 60 mL 11      Allergies   Allergen Reactions     Seasonal Allergies         Physical Examination Review of Systems   Vitals: /73 (BP  Location: Right arm, Patient Position: Sitting, Cuff Size: Adult Large)   Pulse 60   Resp 16   Wt 112.9 kg (249 lb)   BMI 30.01 kg/m    BMI= Body mass index is 30.01 kg/m .  Wt Readings from Last 3 Encounters:   07/23/25 112.9 kg (249 lb)   06/13/25 112.5 kg (248 lb)   02/11/25 114.3 kg (252 lb)       General: pleasant male. No acute distress.   Neck: No JVD  Lungs: clear to auscultation  COR:  regular rate and rhythm, No murmurs, rubs, or gallops  Extrem: No edema        Please refer above for cardiac ROS details.       Past History     Family History:   Family History   Problem Relation Age of Onset     Breast Cancer Mother 69     Heart block Father      Hyperlipidemia Father      Pancreatic Cancer Maternal Grandmother 77     Prostate Cancer Maternal Grandfather      Skin Cancer Maternal Grandfather      Crohn's Disease Brother      Galactosemia Brother      Other - See Comments Brother      Genetic Disorder Brother         Crohn's Disease and galactosemia     Anesthesia Reaction No family hx of      Malignant Hyperthermia No family hx of         Social History:   Social History     Socioeconomic History     Marital status:      Spouse name: Not on file     Number of children: Not on file     Years of education: Not on file     Highest education level: Not on file   Occupational History     Not on file   Tobacco Use     Smoking status: Never     Smokeless tobacco: Never   Vaping Use     Vaping status: Never Used   Substance and Sexual Activity     Alcohol use: Yes     Comment: occ     Drug use: No     Sexual activity: Yes     Partners: Female   Other Topics Concern     Parent/sibling w/ CABG, MI or angioplasty before 65F 55M? Yes     Comment: STENT   Social History Narrative    Works full time at [employer].      Social Drivers of Health     Financial Resource Strain: Low Risk  (6/13/2025)    Financial Resource Strain      Within the past 12 months, have you or your family members you live with been unable  to get utilities (heat, electricity) when it was really needed?: No   Food Insecurity: Low Risk  (6/13/2025)    Food Insecurity      Within the past 12 months, did you worry that your food would run out before you got money to buy more?: No      Within the past 12 months, did the food you bought just not last and you didn t have money to get more?: No   Transportation Needs: Low Risk  (6/13/2025)    Transportation Needs      Within the past 12 months, has lack of transportation kept you from medical appointments, getting your medicines, non-medical meetings or appointments, work, or from getting things that you need?: No   Physical Activity: Sufficiently Active (6/13/2025)    Exercise Vital Sign      Days of Exercise per Week: 5 days      Minutes of Exercise per Session: 60 min   Stress: No Stress Concern Present (6/13/2025)    [employer] [employer] - Occupational Stress Questionnaire      Feeling of Stress : Not at all   Social Connections: Unknown (6/13/2025)    Social Connection and Isolation Panel [NHANES]      Frequency of Communication with Friends and Family: Not on file      Frequency of Social Gatherings with Friends and Family: Twice a week      Attends Restorationist Services: Not on file      Active Member of Clubs or Organizations: Not on file      Attends Club or Organization Meetings: Not on file      Marital Status: Not on file   Interpersonal Safety: Low Risk  (2/11/2025)    Interpersonal Safety      Do you feel physically and emotionally safe where you currently live?: Yes      Within the past 12 months, have you been hit, slapped, kicked or otherwise physically hurt by someone?: No      Within the past 12 months, have you been humiliated or emotionally abused in other ways by your partner or ex-partner?: No   Housing Stability: Low Risk  (6/13/2025)    Housing Stability      Do you have housing? : Yes      Are you worried about losing your housing?: No            Lab Results     Chemistry/lipid CBC Cardiac Enzymes/BNP/TSH/INR   Lab Results   Component Value Date    CHOL 277 (H) 06/13/2025    HDL 35 (L) 06/13/2025    TRIG 233 (H) 06/13/2025    BUN 15.2 06/13/2025     06/13/2025    CO2 25 06/13/2025    Lab Results   Component Value Date    WBC 11.4 (H) 06/13/2025    HGB 15.6 06/13/2025    HCT 46.8 06/13/2025    MCV 85 06/13/2025     06/13/2025    Lab Results   Component Value Date    TSH 0.65 06/13/2025          Cardiac Problems and Cardiac Diagnostics     Most Recent Cardiac testing:  Cardiac CAC 7/15/2025:  IMPRESSIONS:  1.  No coronary calcifications.  2.  The total Agatston calcium score is 0 placing the patient in the  lowest percentile when compared to age and gender matched control  group.  3.  Recommend aggressive risk factor modification.  4.  Please review the separate Radiology report for incidental  noncardiac findings.          Assessment/Recommendations   Assessment:    Mr. Dmitriy Saul is a 50 year old male with a significant past history of HLD, Elevated Lp(a), family history of CAD, who presents for evaluation of cardiac risk.      Cardiac risk   - CAC 0 which predicts low 15 year event rate, however with suboptimal lipid panel and elevated LP(a) agree with statin therapy   - Work on dietary changes, cutting down on restaurant food and red meat in particular   - Continue exercise with cycling   - Consider adding dedicated resistance training   - FLP is planned next month.  Will follow up on this.  Would aim for LDL goal <100.     - If increasing statin would add CoQ 10    RTC 12 months    The longitudinal plan of care for the diagnosis(es)/condition(s) as documented were addressed during this visit. Due to the added complexity in care, I will continue to support Dmitriy in the subsequent management and with ongoing continuity of care.           Kaushik Bojorquez, DO [facility]  Non-invasive Cardiologist  [facility]         Thank you for allowing me to  participate in the care of your patient.      Sincerely,     Kaushik Bojorquez DO     [facility] Heart Care  cc:   Jing Kim MD  [address] [place] [address]

## 2025-08-18 ENCOUNTER — LAB (OUTPATIENT)
Dept: LAB | Facility: CLINIC | Age: 50
End: 2025-08-18
Payer: COMMERCIAL

## 2025-08-18 DIAGNOSIS — E78.2 MIXED HYPERLIPIDEMIA: ICD-10-CM

## 2025-08-18 LAB
ALT SERPL W P-5'-P-CCNC: 50 U/L (ref 0–70)
AST SERPL W P-5'-P-CCNC: 29 U/L (ref 0–45)
CHOLEST SERPL-MCNC: 157 MG/DL
FASTING STATUS PATIENT QL REPORTED: YES
HDLC SERPL-MCNC: 33 MG/DL
LDLC SERPL CALC-MCNC: 86 MG/DL
NONHDLC SERPL-MCNC: 124 MG/DL
TRIGL SERPL-MCNC: 192 MG/DL

## 2025-08-18 PROCEDURE — 84460 ALANINE AMINO (ALT) (SGPT): CPT

## 2025-08-18 PROCEDURE — 84450 TRANSFERASE (AST) (SGOT): CPT

## 2025-08-18 PROCEDURE — 36415 COLL VENOUS BLD VENIPUNCTURE: CPT

## 2025-08-18 PROCEDURE — 80061 LIPID PANEL: CPT

## 2025-08-27 ENCOUNTER — VIRTUAL VISIT (OUTPATIENT)
Dept: FAMILY MEDICINE | Facility: CLINIC | Age: 50
End: 2025-08-27
Payer: COMMERCIAL

## 2025-08-27 DIAGNOSIS — E66.811 CLASS 1 OBESITY WITHOUT SERIOUS COMORBIDITY WITH BODY MASS INDEX (BMI) OF 30.0 TO 30.9 IN ADULT, UNSPECIFIED OBESITY TYPE: Primary | ICD-10-CM

## 2025-08-27 PROCEDURE — 1126F AMNT PAIN NOTED NONE PRSNT: CPT | Mod: 95 | Performed by: FAMILY MEDICINE

## 2025-08-27 PROCEDURE — 98005 SYNCH AUDIO-VIDEO EST LOW 20: CPT | Performed by: FAMILY MEDICINE

## (undated) DEVICE — STOCKING SLEEVE COMPRESSION CALF MED

## (undated) DEVICE — ADAPTER CATH CHECK-FLO 9FR FLL 050885 G15476

## (undated) DEVICE — CATH FOLEY 14FR 5ML SILICONE LUBRI-SIL 175814

## (undated) DEVICE — SOL WATER IRRIG 1000ML BOTTLE 2F7114

## (undated) DEVICE — HOLMIUM LASER HIGH WATTAGE

## (undated) DEVICE — DEVICE STON RTRVL DAKOTA OPENSURE 120CM 1.9FR 11MM

## (undated) DEVICE — URETEROSCOPE FLEX SLG USE STD 7.7FR LITHOVUE M0067913500

## (undated) DEVICE — DRSG GAUZE 4X4" TRAY

## (undated) DEVICE — Device

## (undated) DEVICE — GLOVE PROTEXIS MICRO 7.0  2D73PM70

## (undated) DEVICE — SYR 20ML LL W/O NDL

## (undated) DEVICE — PAD FLOOR SURGISAFE

## (undated) DEVICE — TUBING SET THERMEDX UROLOGY SGL USE LL0006

## (undated) DEVICE — CATH URETERAL DUAL LUMEN 10FRX54CM M0064051000

## (undated) DEVICE — PREP CHLORHEXIDINE 4% 4OZ (HIBICLENS) 57504

## (undated) DEVICE — SOL NACL 0.9% IRRIG 3000ML BAG 07972-08

## (undated) DEVICE — STRAP KNEE/BODY 31143004

## (undated) DEVICE — DRAPE C-ARM 60X42" 1013

## (undated) DEVICE — GUIDEWIRE SENSOR DUAL FLEX STR 0.035"X150CM M0066703080

## (undated) RX ORDER — ONDANSETRON 2 MG/ML
INJECTION INTRAMUSCULAR; INTRAVENOUS
Status: DISPENSED
Start: 2025-02-11

## (undated) RX ORDER — FENTANYL CITRATE 50 UG/ML
INJECTION, SOLUTION INTRAMUSCULAR; INTRAVENOUS
Status: DISPENSED
Start: 2025-02-11

## (undated) RX ORDER — LIDOCAINE HYDROCHLORIDE 10 MG/ML
INJECTION, SOLUTION EPIDURAL; INFILTRATION; INTRACAUDAL; PERINEURAL
Status: DISPENSED
Start: 2025-02-11

## (undated) RX ORDER — FENTANYL CITRATE 50 UG/ML
INJECTION, SOLUTION INTRAMUSCULAR; INTRAVENOUS
Status: DISPENSED
Start: 2024-10-02

## (undated) RX ORDER — LIDOCAINE HYDROCHLORIDE 10 MG/ML
INJECTION, SOLUTION EPIDURAL; INFILTRATION; INTRACAUDAL; PERINEURAL
Status: DISPENSED
Start: 2024-10-02

## (undated) RX ORDER — PROPOFOL 10 MG/ML
INJECTION, EMULSION INTRAVENOUS
Status: DISPENSED
Start: 2024-10-02

## (undated) RX ORDER — DEXAMETHASONE SODIUM PHOSPHATE 10 MG/ML
INJECTION, SOLUTION INTRAMUSCULAR; INTRAVENOUS
Status: DISPENSED
Start: 2025-02-11

## (undated) RX ORDER — CANDIDA ALBICANS 1000 [PNU]/ML
INJECTION, SOLUTION INTRADERMAL
Status: DISPENSED
Start: 2024-10-11

## (undated) RX ORDER — PROPOFOL 10 MG/ML
INJECTION, EMULSION INTRAVENOUS
Status: DISPENSED
Start: 2025-02-11

## (undated) RX ORDER — CANDIDA ALBICANS 1000 [PNU]/ML
INJECTION, SOLUTION INTRADERMAL
Status: DISPENSED
Start: 2024-07-10

## (undated) RX ORDER — CANDIDA ALBICANS 1000 [PNU]/ML
INJECTION, SOLUTION INTRADERMAL
Status: DISPENSED
Start: 2024-11-06

## (undated) RX ORDER — CANDIDA ALBICANS 1000 [PNU]/ML
INJECTION, SOLUTION INTRADERMAL
Status: DISPENSED
Start: 2024-08-16

## (undated) RX ORDER — ACETAMINOPHEN 325 MG/1
TABLET ORAL
Status: DISPENSED
Start: 2024-10-02

## (undated) RX ORDER — DEXAMETHASONE SODIUM PHOSPHATE 10 MG/ML
INJECTION, SOLUTION INTRAMUSCULAR; INTRAVENOUS
Status: DISPENSED
Start: 2024-10-02

## (undated) RX ORDER — ONDANSETRON 2 MG/ML
INJECTION INTRAMUSCULAR; INTRAVENOUS
Status: DISPENSED
Start: 2024-10-02

## (undated) RX ORDER — GLYCOPYRROLATE 0.2 MG/ML
INJECTION, SOLUTION INTRAMUSCULAR; INTRAVENOUS
Status: DISPENSED
Start: 2025-02-11

## (undated) RX ORDER — SCOLOPAMINE TRANSDERMAL SYSTEM 1 MG/1
PATCH, EXTENDED RELEASE TRANSDERMAL
Status: DISPENSED
Start: 2024-10-02

## (undated) RX ORDER — LIDOCAINE HYDROCHLORIDE AND EPINEPHRINE 10; 10 MG/ML; UG/ML
INJECTION, SOLUTION INFILTRATION; PERINEURAL
Status: DISPENSED
Start: 2024-05-29

## (undated) RX ORDER — CANDIDA ALBICANS 1000 [PNU]/ML
INJECTION, SOLUTION INTRADERMAL
Status: DISPENSED
Start: 2024-05-29